# Patient Record
Sex: FEMALE | Race: WHITE | Employment: OTHER | URBAN - METROPOLITAN AREA
[De-identification: names, ages, dates, MRNs, and addresses within clinical notes are randomized per-mention and may not be internally consistent; named-entity substitution may affect disease eponyms.]

---

## 2017-12-21 ENCOUNTER — HOSPITAL ENCOUNTER (OUTPATIENT)
Dept: GENERAL RADIOLOGY | Age: 77
Discharge: HOME | End: 2017-12-21
Attending: INTERNAL MEDICINE
Payer: MEDICARE

## 2017-12-21 DIAGNOSIS — R05.9 COUGH: ICD-10-CM

## 2017-12-21 PROCEDURE — 71020 XR CHEST PA LAT: CPT

## 2018-01-08 ENCOUNTER — ANESTHESIA EVENT (OUTPATIENT)
Dept: ENDOSCOPY | Age: 78
End: 2018-01-08
Payer: MEDICARE

## 2018-01-09 ENCOUNTER — HOSPITAL ENCOUNTER (OUTPATIENT)
Age: 78
Setting detail: OUTPATIENT SURGERY
Discharge: HOME | End: 2018-01-09
Attending: INTERNAL MEDICINE | Admitting: INTERNAL MEDICINE
Payer: MEDICARE

## 2018-01-09 ENCOUNTER — ANESTHESIA (OUTPATIENT)
Dept: ENDOSCOPY | Age: 78
End: 2018-01-09
Payer: MEDICARE

## 2018-01-09 VITALS
SYSTOLIC BLOOD PRESSURE: 108 MMHG | HEART RATE: 78 BPM | OXYGEN SATURATION: 97 % | DIASTOLIC BLOOD PRESSURE: 63 MMHG | RESPIRATION RATE: 16 BRPM | TEMPERATURE: 98.1 F | HEIGHT: 65 IN | BODY MASS INDEX: 25.33 KG/M2 | WEIGHT: 152 LBS

## 2018-01-09 PROBLEM — R10.13 EPIGASTRIC PAIN: Status: ACTIVE | Noted: 2018-01-09

## 2018-01-09 PROCEDURE — 88305 TISSUE EXAM BY PATHOLOGIST: CPT | Performed by: INTERNAL MEDICINE

## 2018-01-09 PROCEDURE — 77030009426 HC FCPS BIOP ENDOSC BSC -B: Performed by: INTERNAL MEDICINE

## 2018-01-09 PROCEDURE — 76060000031 HC ANESTHESIA FIRST 0.5 HR: Performed by: INTERNAL MEDICINE

## 2018-01-09 PROCEDURE — 74011250636 HC RX REV CODE- 250/636: Performed by: NURSE ANESTHETIST, CERTIFIED REGISTERED

## 2018-01-09 PROCEDURE — 76040000019: Performed by: INTERNAL MEDICINE

## 2018-01-09 PROCEDURE — 74011250636 HC RX REV CODE- 250/636

## 2018-01-09 RX ORDER — SODIUM CHLORIDE 0.9 % (FLUSH) 0.9 %
5-10 SYRINGE (ML) INJECTION AS NEEDED
Status: CANCELLED | OUTPATIENT
Start: 2018-01-09 | End: 2018-01-09

## 2018-01-09 RX ORDER — SODIUM CHLORIDE, SODIUM LACTATE, POTASSIUM CHLORIDE, CALCIUM CHLORIDE 600; 310; 30; 20 MG/100ML; MG/100ML; MG/100ML; MG/100ML
75 INJECTION, SOLUTION INTRAVENOUS CONTINUOUS
Status: DISCONTINUED | OUTPATIENT
Start: 2018-01-09 | End: 2018-01-09 | Stop reason: HOSPADM

## 2018-01-09 RX ORDER — SODIUM CHLORIDE 0.9 % (FLUSH) 0.9 %
5-10 SYRINGE (ML) INJECTION EVERY 8 HOURS
Status: CANCELLED | OUTPATIENT
Start: 2018-01-09 | End: 2018-01-09

## 2018-01-09 RX ORDER — DEXTROMETHORPHAN/PSEUDOEPHED 2.5-7.5/.8
1.2 DROPS ORAL
Status: CANCELLED | OUTPATIENT
Start: 2018-01-09

## 2018-01-09 RX ORDER — PROPOFOL 10 MG/ML
INJECTION, EMULSION INTRAVENOUS AS NEEDED
Status: DISCONTINUED | OUTPATIENT
Start: 2018-01-09 | End: 2018-01-09 | Stop reason: HOSPADM

## 2018-01-09 RX ORDER — PROPOFOL 10 MG/ML
INJECTION, EMULSION INTRAVENOUS
Status: DISCONTINUED | OUTPATIENT
Start: 2018-01-09 | End: 2018-01-09 | Stop reason: HOSPADM

## 2018-01-09 RX ORDER — SODIUM CHLORIDE 9 MG/ML
25 INJECTION, SOLUTION INTRAVENOUS CONTINUOUS
Status: CANCELLED | OUTPATIENT
Start: 2018-01-09 | End: 2018-01-09

## 2018-01-09 RX ADMIN — PROPOFOL 100 MCG/KG/MIN: 10 INJECTION, EMULSION INTRAVENOUS at 10:20

## 2018-01-09 RX ADMIN — PROPOFOL 100 MG: 10 INJECTION, EMULSION INTRAVENOUS at 10:20

## 2018-01-09 RX ADMIN — SODIUM CHLORIDE, SODIUM LACTATE, POTASSIUM CHLORIDE, AND CALCIUM CHLORIDE 75 ML/HR: 600; 310; 30; 20 INJECTION, SOLUTION INTRAVENOUS at 09:12

## 2018-01-09 NOTE — ANESTHESIA PREPROCEDURE EVALUATION
Anesthetic History   No history of anesthetic complications            Review of Systems / Medical History  Patient summary reviewed and pertinent labs reviewed    Pulmonary  Within defined limits                 Neuro/Psych   Within defined limits           Cardiovascular    Hypertension: well controlled        Dysrhythmias : sinus tachycardia           GI/Hepatic/Renal  Within defined limits              Endo/Other  Within defined limits           Other Findings   Comments:   Risk Factors for Postoperative nausea/vomiting:       History of postoperative nausea/vomiting? NO       Female? YES       Motion sickness? NO       Intended opioid administration for postoperative analgesia? NO      Smoking Abstinence  Current Smoker? NO  Elective Surgery? YES  Seen preoperatively by anesthesiologist or proxy prior to day of surgery? YES  Pt abstained from smoking 24 hours prior to anesthesia?  N/A           Physical Exam    Airway  Mallampati: II  TM Distance: 4 - 6 cm  Neck ROM: normal range of motion   Mouth opening: Normal     Cardiovascular  Regular rate and rhythm,  S1 and S2 normal,  no murmur, click, rub, or gallop             Dental  No notable dental hx       Pulmonary  Breath sounds clear to auscultation               Abdominal  Abdominal exam normal       Other Findings            Anesthetic Plan    ASA: 3  Anesthesia type: MAC            Anesthetic plan and risks discussed with: Patient

## 2018-01-09 NOTE — ANESTHESIA POSTPROCEDURE EVALUATION
Post-Anesthesia Evaluation and Assessment    Patient: Regan Jackson MRN: [id number]  SSN: [id number]    YOB: 1940  Age: 78 y.o. Sex: female      Data from PACU flowsheet    Cardiovascular Function/Vital Signs  Visit Vitals    /63    Pulse 78    Temp 36.7 °C (98.1 °F)    Resp 16    Ht 5' 5\" (1.651 m)    Wt 68.9 kg (152 lb)    SpO2 97%    BMI 25.29 kg/m2       Patient is status post anesthesia    Nausea/Vomiting: controlled    Postoperative hydration reviewed and adequate. Pain:  Managed    Neurological Status: At baseline    Mental Status and Level of Consciousness: Alert and oriented     Pulmonary Status:   Adequate oxygenation and airway patent    Complications related to anesthesia: None    Post-anesthesia assessment completed.  No concerns    Signed By: Shira Moreno CRNA     January 9, 2018

## 2018-01-09 NOTE — PROCEDURES
Endoscopy Procedure Note    Patient: Rigo Cruz MRN: [id number]  SSN: [id number]    YOB: 1940  Age: 78 y.o. Sex: female      Date/Time:  1/9/2018 10:25 AM    Esophagogastroduodenoscopy (EGD) Procedure Report    Procedure: Esophagogastroduodenoscopy with biopsy    IMPRESSION:   1. Irregular Z-line--hyperplastic appearing tissue at the GE junction bx'd to confirm  2. Normal mucosa of the stomach, duodenal bulb and second portion    RECOMMENDATIONS:  1. Call me in 2-3 weeks for biopsy results if not received beforehand. 2. Call my office to obtain a sheet of FLATUS PRODUCING FOODS. Avoid all foods in the MODERATE AND SEVERE FLATUS PRODUCING CATEGORIES  3.  Keep Feb office appt  4. Call my office to arrange dedicated small bowel xray --and call me 2 days later for results    Indication: abdominal pain  :  Verna Dye MD  Referring Provider:   Anita Headley MD  Assistants: Endoscopy Technician-1: Rosie Lesch  Pre Op Nurse: Gary Finley RN  Endoscopy RN-1: Jeffrey Ireland RN  History: The history and physical exam were reviewed and updated. Endoscope: [id number]  ASA: ASA 2 - Patient with mild systemic disease with no functional limitations  Mallampati: II (soft palate, uvula, fauces visible)  Sedation:  MAC anesthesia    Description of the procedure: The procedure was discussed with the patient including risks, benefits, alternatives including risks of iv sedation, bleeding, perforation and aspiration. A safety timeout was performed. The patient was placed in the left lateral decubitus position. A bite block was placed. The patient was given incremental doses of intravenous medication until moderate sedation was achieved. The patients vital signs were monitored at all times including heart rate/rhythm, blood pressure and oxygen saturation.     The endoscope was then passed under direct visualization into the esophagus, across the GE junction into the stomach and through the pylorus into the duodenal bulb and second portion. The endoscope was then slowly withdrawn while visualizing the mucosa. In the stomach a retroflexion was performed and gastric fundus and cardia visualized. .The endoscope was then slowly withdrawn. The patient was then transferred to recovery in stable condition. Findings: see impression above    Complications:   none    EBL: minimal    Discharge disposition:  Home in the company of  when able to ambulate    Monroe Justin.  MD Lupis, ANA CRISTINA Hatch  January 9, 2018  10:25 AM

## 2018-01-09 NOTE — IP AVS SNAPSHOT
[address] [address] [address] 
[phone] Patient: Lasha Tiwari MRN: [id number]:[id number] About your hospitalization You were admitted on:  January 9, 2018 You last received care in the:  [facility] PHASE 2 RECOVERY You were discharged on:  January 9, 2018 Why you were hospitalized Your primary diagnosis was:  Epigastric Pain Follow-up Information Follow up With Details Comments Contact Info Portia Coleman MD   [address] [id number] [phone] Discharge Orders None A check priyanka indicates which time of day the medication should be taken. My Medications CONTINUE taking these medications Instructions Each Dose to Equal  
 Morning Noon Evening Bedtime  
 calcium and magnesium carbonat 311-232 mg per tablet Your last dose was: Your next dose is: Take 1 Tab by mouth daily. 1 Tab  
    
   
   
   
  
 COQ10 (LIPOSOMAL UBIQUINOL) PO Your last dose was: Your next dose is: Take  by mouth. CRESTOR 5 mg tablet Generic drug:  rosuvastatin Your last dose was: Your next dose is: Take 5 mg by mouth nightly. 5 mg FISH OIL 1,000 mg Cap Generic drug:  omega-3 fatty acids-vitamin e Your last dose was: Your next dose is: Take 1 Cap by mouth daily. 1 Cap  
    
   
   
   
  
 lisinopril 10 mg tablet Commonly known as:  De Douse Your last dose was: Your next dose is: Take 10 mg by mouth daily. 10 mg  
    
   
   
   
  
 MULTIVITAMIN & MINERAL FORMULA PO Your last dose was: Your next dose is: Take 1 Tab by mouth daily. 1 Tab VITAMIN D3 PO Your last dose was: Your next dose is: Take  by mouth. Vitamins-Lipotropics 200-100 mg Tab Your last dose was: Your next dose is: Take  by mouth. Discharge Instructions RECOMMENDATIONS: 
1. Call me in 2-3 weeks for biopsy results if not received beforehand. 2. Call my office to obtain a sheet of FLATUS PRODUCING FOODS. Avoid all foods in the MODERATE AND SEVERE FLATUS PRODUCING CATEGORIES 3.  Keep Feb office appt 4. Call my office to arrange dedicated small bowel xray --and call me 2 days later for results Upper GI Endoscopy: What to Expect at [facility] Your Recovery After you have an endoscopy, you will stay at the hospital or clinic for 1 to 2 hours. This will allow the medicine to wear off. You will be able to go home after your doctor or nurse checks to make sure you are not having any problems. You may have to stay overnight if you had treatment during the test. You may have a sore throat for a day or two after the test. 
This care sheet gives you a general idea about what to expect after the test. 
How can you care for yourself at home? Activity · Rest as much as you need to after you go home. · You should be able to go back to your usual activities the day after the test. 
Diet · Follow your doctor's directions for eating after the test. 
· Drink plenty of fluids (unless your doctor has told you not to). Medications · If you have a sore throat the day after the test, use an over-the-counter spray to numb your throat. Follow-up care is a key part of your treatment and safety. Be sure to make and go to all appointments, and call your doctor if you are having problems. It's also a good idea to know your test results and keep a list of the medicines you take. When should you call for help? Call 911 anytime you think you may need emergency care. For example, call if: 
? · You passed out (loses consciousness). ? · You have trouble breathing. ? · You pass maroon or bloody stools. ?Call your doctor now or seek immediate medical care if: 
? · You have pain that does not get better after your take pain medicine. ? · You have new or worse belly pain. ? · You have blood in your stools. ? · You are sick to your stomach and cannot keep fluids down. ? · You have a fever. ? · You cannot pass stools or gas. ? Watch closely for changes in your health, and be sure to contact your doctor if: 
? · Your throat still hurts after a day or two. ? · You do not get better as expected. Where can you learn more? Go to http://abbey-madeline.info/. Enter (46) 185-143 in the search box to learn more about \"Upper GI Endoscopy: What to Expect at Home. \" Current as of: May 12, 2017 Content Version: 11.4 © 9302-5947 [employer]. Care instructions adapted under license by [employer] (which disclaims liability or warranty for this information). If you have questions about a medical condition or this instruction, always ask your healthcare professional. [place] [address] any warranty or liability for your use of this information. DISCHARGE SUMMARY from Nurse PATIENT INSTRUCTIONS: 
 
After general anesthesia or intravenous sedation, for 24 hours or while taking prescription Narcotics: · Limit your activities · Do not drive and operate hazardous machinery · Do not make important personal or business decisions · Do  not drink alcoholic beverages · If you have not urinated within 8 hours after discharge, please contact your surgeon on call. Report the following to your surgeon: 
· Excessive pain, swelling, redness or odor of or around the surgical area · Temperature over 100.5 · Nausea and vomiting lasting longer than 4 hours or if unable to take medications · Any signs of decreased circulation or nerve impairment to extremity: change in color, persistent  numbness, tingling, coldness or increase pain · Any questions What to do at Home: 
Recommended activity: Activity as tolerated and no driving for today. These are general instructions for a healthy lifestyle: No smoking/ No tobacco products/ Avoid exposure to second hand smoke Surgeon General's Warning:  Quitting smoking now greatly reduces serious risk to your health. Obesity, smoking, and sedentary lifestyle greatly increases your risk for illness A healthy diet, regular physical exercise & weight monitoring are important for maintaining a healthy lifestyle You may be retaining fluid if you have a history of heart failure or if you experience any of the following symptoms:  Weight gain of 3 pounds or more overnight or 5 pounds in a week, increased swelling in our hands or feet or shortness of breath while lying flat in bed. Please call your doctor as soon as you notice any of these symptoms; do not wait until your next office visit. Recognize signs and symptoms of STROKE: 
 
F-face looks uneven A-arms unable to move or move unevenly S-speech slurred or non-existent T-time-call 911 as soon as signs and symptoms begin-DO NOT go Back to bed or wait to see if you get better-TIME IS BRAIN. Warning Signs of HEART ATTACK Call 911 if you have these symptoms: 
? Chest discomfort. Most heart attacks involve discomfort in the center of the chest that lasts more than a few minutes, or that goes away and comes back. It can feel like uncomfortable pressure, squeezing, fullness, or pain. ? Discomfort in other areas of the upper body. Symptoms can include pain or discomfort in one or both arms, the back, neck, jaw, or stomach. ? Shortness of breath with or without chest discomfort. ? Other signs may include breaking out in a cold sweat, nausea, or lightheadedness. Don't wait more than five minutes to call [address]! Fast action can save your life.  Calling 911 is almost always the fastest way to get lifesaving treatment. Emergency Medical Services staff can begin treatment when they arrive  up to an hour sooner than if someone gets to the hospital by car. The discharge information has been reviewed with the patient. The patient verbalized understanding. Discharge medications reviewed with the patient and appropriate educational materials and side effects teaching were provided. Patient armband removed and given to patient to take home. Patient was informed of the privacy risks if armband lost or stolen 
 
___________________________________________________________________________________________________________________________________ Introducing [facility]! Dear Rudy Truong: Thank you for requesting a [employer] account. Our records indicate that you already have an active [employer] account. You can access your account anytime at [email or web address] [email or web address] Did you know that you can access your hospital and ER discharge instructions at any time in [employer]? You can also review all of your test results from your hospital stay or ER visit. Additional Information If you have questions, please visit the Frequently Asked Questions section of the [employer] website at [email or web address] [email or web address] Remember, [employer] is NOT to be used for urgent needs. For medical emergencies, dial 911. Now available from your iPhone and Android! Providers Seen During Your Hospitalization Provider Specialty Primary office phone Miles Hollis MD Gastroenterology [phone] Your Primary Care Physician (PCP) Primary Care Physician Office Phone Office Fax Kendra Linda [phone] You are allergic to the following Allergen Reactions Sulfa (Sulfonamide Antibiotics) Swelling Recent Documentation Height Weight BMI OB Status Smoking Status 1.651 m 68.9 kg 25.29 kg/m2 Postmenopausal Former Smoker Emergency Contacts Name Discharge Info Relation Home Work Mobile Tom Figueroa Sr DISCHARGE CAREGIVER [3] Spouse [3] [phone] Patient Belongings The following personal items are in your possession at time of discharge: 
  Dental Appliances: Partials  Visual Aid: Glasses Please provide this summary of care documentation to your next provider. Signatures-by signing, you are acknowledging that this After Visit Summary has been reviewed with you and you have received a copy. Patient Signature:  ____________________________________________________________ Date:  ____________________________________________________________  
  
[facility] Provider Signature:  ____________________________________________________________ Date:  ____________________________________________________________

## 2018-01-09 NOTE — DISCHARGE INSTRUCTIONS
RECOMMENDATIONS:  1. Call me in 2-3 weeks for biopsy results if not received beforehand. 2. Call my office to obtain a sheet of FLATUS PRODUCING FOODS. Avoid all foods in the MODERATE AND SEVERE FLATUS PRODUCING CATEGORIES  3.  Keep Feb office appt  4. Call my office to arrange dedicated small bowel xray --and call me 2 days later for results       Upper GI Endoscopy: What to Expect at [address] [place] [address]  After you have an endoscopy, you will stay at the hospital or clinic for 1 to 2 hours. This will allow the medicine to wear off. You will be able to go home after your doctor or nurse checks to make sure you are not having any problems. You may have to stay overnight if you had treatment during the test. You may have a sore throat for a day or two after the test.  This care sheet gives you a general idea about what to expect after the test.  How can you care for yourself at home? Activity  · Rest as much as you need to after you go home. · You should be able to go back to your usual activities the day after the test.  Diet  · Follow your doctor's directions for eating after the test.  · Drink plenty of fluids (unless your doctor has told you not to). Medications  · If you have a sore throat the day after the test, use an over-the-counter spray to numb your throat. Follow-up care is a key part of your treatment and safety. Be sure to make and go to all appointments, and call your doctor if you are having problems. It's also a good idea to know your test results and keep a list of the medicines you take. When should you call for help? Call 911 anytime you think you may need emergency care. For example, call if:  ? · You passed out (loses consciousness). ? · You have trouble breathing. ? · You pass maroon or bloody stools. ?Call your doctor now or seek immediate medical care if:  ? · You have pain that does not get better after your take pain medicine. ? · You have new or worse belly pain.    ? · You have blood in your stools. ? · You are sick to your stomach and cannot keep fluids down. ? · You have a fever. ? · You cannot pass stools or gas. ? Watch closely for changes in your health, and be sure to contact your doctor if:  ? · Your throat still hurts after a day or two. ? · You do not get better as expected. Where can you learn more? Go to http://abbey-madeline.info/. Enter (52) [phone] in the search box to learn more about \"Upper GI Endoscopy: What to Expect at Home. \"  Current as of: May 12, 2017  Content Version: 11.4  © 7851-7425 [employer]. Care instructions adapted under license by [employer] (which disclaims liability or warranty for this information). If you have questions about a medical condition or this instruction, always ask your healthcare professional. [place] [address] any warranty or liability for your use of this information. DISCHARGE SUMMARY from Nurse    PATIENT INSTRUCTIONS:    After general anesthesia or intravenous sedation, for 24 hours or while taking prescription Narcotics:  · Limit your activities  · Do not drive and operate hazardous machinery  · Do not make important personal or business decisions  · Do  not drink alcoholic beverages  · If you have not urinated within 8 hours after discharge, please contact your surgeon on call. Report the following to your surgeon:  · Excessive pain, swelling, redness or odor of or around the surgical area  · Temperature over 100.5  · Nausea and vomiting lasting longer than 4 hours or if unable to take medications  · Any signs of decreased circulation or nerve impairment to extremity: change in color, persistent  numbness, tingling, coldness or increase pain  · Any questions    What to do at Home:  Recommended activity: Activity as tolerated and no driving for today.          These are general instructions for a healthy lifestyle:    No smoking/ No tobacco products/ Avoid exposure to second hand smoke  Surgeon General's Warning:  Quitting smoking now greatly reduces serious risk to your health. Obesity, smoking, and sedentary lifestyle greatly increases your risk for illness    A healthy diet, regular physical exercise & weight monitoring are important for maintaining a healthy lifestyle    You may be retaining fluid if you have a history of heart failure or if you experience any of the following symptoms:  Weight gain of 3 pounds or more overnight or 5 pounds in a week, increased swelling in our hands or feet or shortness of breath while lying flat in bed. Please call your doctor as soon as you notice any of these symptoms; do not wait until your next office visit. Recognize signs and symptoms of STROKE:    F-face looks uneven    A-arms unable to move or move unevenly    S-speech slurred or non-existent    T-time-call 911 as soon as signs and symptoms begin-DO NOT go       Back to bed or wait to see if you get better-TIME IS BRAIN. Warning Signs of HEART ATTACK     Call 911 if you have these symptoms:   Chest discomfort. Most heart attacks involve discomfort in the center of the chest that lasts more than a few minutes, or that goes away and comes back. It can feel like uncomfortable pressure, squeezing, fullness, or pain.  Discomfort in other areas of the upper body. Symptoms can include pain or discomfort in one or both arms, the back, neck, jaw, or stomach.  Shortness of breath with or without chest discomfort.  Other signs may include breaking out in a cold sweat, nausea, or lightheadedness. Don't wait more than five minutes to call 911 - MINUTES MATTER! Fast action can save your life. Calling 911 is almost always the fastest way to get lifesaving treatment. Emergency Medical Services staff can begin treatment when they arrive -- up to an hour sooner than if someone gets to the hospital by car. The discharge information has been reviewed with the patient. The patient verbalized understanding. Discharge medications reviewed with the patient and appropriate educational materials and side effects teaching were provided. Patient armband removed and given to patient to take home.   Patient was informed of the privacy risks if armband lost or stolen    ___________________________________________________________________________________________________________________________________

## 2018-01-09 NOTE — H&P
Reason for Appointment   1. H. pylori infection   2. Heartburn   3. Bowel irregularity     History of Present Illness   General:   I saw the patient in the office last in February 2014. I have been seeing her for abdominal pain. Extensive testing including an upper endoscopy, colonoscopy PillCam examination of the small bowel, abdominal ultrasound, HIDA scan with CCK had all been negative or nondiagnostic for sludge in the gallbladder with normal gallbladder function indicated by ejection fraction of 40%. I did not recommend cholecystectomy as I thought the patient's symptoms are likely functional in origin. I recommended monitoring symptoms with reevaluation depending on the clinical course. Her next colonoscopy was due in 10 years which in 2023 because she was average risk. I have not seen the patient now since 2014. December 5, 2017--The patient's here to discuss a variety of GI issues. Since the first of the year she's been having episodes of supraventricular tachycardia. She relates this to the stress of moving and losing one of her best friend's Mrs. Kacey Gardiner, who was also a patient of mine. She developed substernal burning discomfort which she believes is related to stress as well. She went to a homeopathic physician who ordered a stool specimen for a variety of testing. Her bowel movements have been erratic. She's had excessive flatulence more so than eructation. She is taking Panzyme 3 tablets daily with each meal and digestive enzyme 3 tablets with the last bite of each meal to help her digestion. She has noticed some improvement. She was told that she tested positive for H. pylori and her stool calprotectin was elevated at 115. It should be less than 50. She was advised to see a traditional physician about the source of the elevated stool calprotectin. She denies nausea or vomiting.  She does not have abdominal pain but rather substernal burning discomfort and bowel irregularity as her main complaints in addition to flatulence. She denies melena or hematochezia. Her appetite is good and her weight is stable. Current Medications   Taking    Vit D    Calcium    multivitamin Multiple Vitamins capsule 1 cap(s) orally once a day    lisinopril 10 mg tablet 1 tab(s) orally QD    Crestor 5 mg tablet 1 tab(s) orally once a day (at bedtime)    Omega-3 1000 mg capsule 1 cap(s) orally 3 times a day    Co Q-10 100 mg capsule 1 cap(s) orally once a day    Probiotic 1tab qd    Digestive Enzymes Tid    Not-Taking/PRN    Pataday 0.2% solution 1 gtt in each affected eye once a day    Alrex 0.2% suspension 1 gtt in each affected eye 4 times a day    Discontinued    Vit B-12 UNKNOWN as directed as directed as directed as directed    Vitamin C    Fish Oil    Antioxidant Formula Antioxidant Multiple Vitamins and Minerals capsule 1 cap(s) orally once a day    Tretinoin Emollient Topical 0.05% cream 1 ramirez applied topically once a day (at bedtime)    tretinoin topical 0.05% cream 1 ramirez applied topically once a day (at bedtime)    ammonium lactate topical 12% cream 1 ramirez applied topically 2 times a day    Medication List reviewed and reconciled with the patient      Past Medical History   Diverticulosis. Small External Hemorrhoids. Hypertension. Hyperlipidemia. Surgical History   Colonoscopy    Tonsillectomy    Appendectomy    Cataract    BTL    Stent in tear duct 2012   repair of enterocele/rectocele May 2011     Family History   Father: , Abiola Urbina   Mother:    Maternal Grand Mother: Diabetes   2 sister(s) . No family history of colon cancer, colon polyps, or GI cancer. No inflammatory bowel disease, stomach, liver, or pancreatic cancer, or chronic liver disease to her knowledge. Social History   Marital Status: . Occupation: Retired. Smoking   Tobacco use: former smoker  Alcohol: socially, often wine with dinner, beer in summer. no Drugs (Illicit). no Tattoos.    Body piercing: ears pierced. no Blood transfusions. Allergies   Sulfa: Swelling: Allergy     Review of Systems   Complete review of systems taken. Positives per HPI. Negatives will not be listed here. Vital Signs   BMI 25.62, HR 75, /82, Wt 154, Ht 5'5\", RR 16.     Examination   General examination:  LABORATORY DATA: Lab work July 27, 2017, YFN negative. .Lab work May 2017: WBC 4.1, hemoglobin 13.8 with hematocrit 42.5, MCV 93, platelets 684. BMP showed a chloride of 196, creatinine 0.7, otherwise normal Stool specimen October 2, 2017: C. difficile toxin, enteric pathogens negative. Cryptosporidia, amoeba, Giardia negative. H. pylori elevated, the method of assay, however, is not clear. Fecal occult blood negative. Antigliadin antibody IgA negative. Physical Examination   General: Pleasant, anxious appearing female in no obvious distress. Neck: Supple. No thyromegaly or mass palpable. Nodes: No supraclavicular, axillary, cervical nodes palpable. Chest: Clear to auscultation with symmetric breath sounds and good air movement. Heart: First and second heart sounds are normal. No murmurs, rubs, gallops. Abdomen: Soft, nontender. No organomegaly or mass palpable. Bowel sounds are normal with no bruits. Extremities: No pedal edema. Assessments     1. Helicobacter pylori (H. pylori) - A04.8 (Primary), The patient had a stool panel checked for various disorders/diseases. It was positive for H. pylori, B fragilis, calprotectin. Elastase was low. I would prefer to check stool for H. pylori antigen in a conventional lab to verify that it is, in fact, positive before subjecting the patient to quadruple therapy for H. pylori over 2 weeks of time since there are significant side effects from these medications in some people.  If stool for H. pylori antigen is in fact documented to be positive, I will then recommend quadruple therapy and reassess H. pylori antigen in the stool after 6-8 weeks to verify successful eradication. The patient's recent symptoms of heartburn and bowel irregularity are likely stress related as suggested by Dr. Shantell Wyatt. 2. Essential (primary) hypertension - I10     3. FHx: SVT (supraventricular tachycardia) - Z82.49     4. Change in bowel function - R19.4, Bowel irregularity has improved with digestive enzymes and gastric acid pills. She reports improvement in her substernal burning discomfort on this regimen as well. Will need to monitor at some point after discontinuing these medications for several weeks to see if symptoms return at which point further testing may be necessary. Treatment   1. Helicobacter pylori (H. pylori)   LAB: Stool H. Pylori Antigen  Notes: 1. Stool for H. Pylori antigen today. Call me next week for results. 2. If positive, then I will treat H. pylori with quadruple therapy and then repeat stool for H. pylori antigen in 8 weeks or so after she completes treatment. 3. If stool for H. pylori antigen at a conventional lab is negative however, I would not recommend treatment. 2. Change in bowel function   Notes: 1. The patient should avoid lactose-containing foods and beverages. 2. The patient should avoid artificial sweeteners. 3. Over the next several weeks she should discontinue her digestive enzyme and gastric acid and call back with a report in 3 weeks as to current symptoms. 4. Depending on those symptoms, further testing may be necessary or perhaps continued monitoring. 5. OK to use SOY, RICE, ALMOND milk for now. 6. Office in in late Feb/early March. Symptoms improved transiently while on PPI therapy but have returned, EGD to r/o ulcer or UGI neoplasm.  Otherwise, no significant change in history or physical exam since last office visit

## 2018-01-15 ENCOUNTER — HOSPITAL ENCOUNTER (OUTPATIENT)
Dept: GENERAL RADIOLOGY | Age: 78
Discharge: HOME | End: 2018-01-15
Attending: INTERNAL MEDICINE
Payer: MEDICARE

## 2018-01-15 DIAGNOSIS — R10.13 EPIGASTRIC PAIN: ICD-10-CM

## 2018-01-15 PROCEDURE — 74011000255 HC RX REV CODE- 255: Performed by: INTERNAL MEDICINE

## 2018-01-15 PROCEDURE — 74250 X-RAY XM SM INT 1CNTRST STD: CPT

## 2018-01-15 RX ADMIN — BARIUM SULFATE 352 G: 960 POWDER, FOR SUSPENSION ORAL at 11:29

## 2018-06-06 ENCOUNTER — HOSPITAL ENCOUNTER (OUTPATIENT)
Dept: PHYSICAL THERAPY | Age: 78
Discharge: HOME | End: 2018-06-06
Payer: MEDICARE

## 2018-06-06 PROCEDURE — 97110 THERAPEUTIC EXERCISES: CPT

## 2018-06-06 PROCEDURE — G8978 MOBILITY CURRENT STATUS: HCPCS

## 2018-06-06 PROCEDURE — G8979 MOBILITY GOAL STATUS: HCPCS

## 2018-06-06 PROCEDURE — 97140 MANUAL THERAPY 1/> REGIONS: CPT

## 2018-06-06 PROCEDURE — 97161 PT EVAL LOW COMPLEX 20 MIN: CPT

## 2018-06-06 NOTE — PROGRESS NOTES
PT KNEE EVAL AND TREATMENT    Patient Name: Fuentes Newman  Date:2018  : 1940  [x]  Patient  Verified  [place]or: [place] MED[place] / Plan: [place] MEDICARE PART A & B / Product Type: Medicare /    In time:915  Out time:1015  Total Treatment Time (min): 60  Total Timed Codes (min): 25  1:1 Treatment Time ( [address] only): 50   Visit #: 1 of 8-10    Treatment Area: Right knee pain [M25.561]    SUBJECTIVE  Pain Level (0-10 scale): (C):0  (B): 0 (W):  10  Any medication changes, allergies to medications, diagnosis change, or new procedure performed: see summary sheet for update  Subjective functional status/changes:  CHIEF COMPLAINT: Patient reports with co R knee/ posterior leg pain after a mild fall onto the knee and \"pulling\" the knee when trying to get boots off in 2018. Pain is progressively improving and patient reports only certain movements that aggravate the pain such as lifting motion/ climbing bleachers. Xrays show arthritis. DEFICITS: fear avoidance, stair negotiation - heath descending, gym activities: riding bike, leg wt machine     OBJECTIVE  Posture:  WNL    Gait:  WNL     ROM / Strength                               AROM                   Strength (1-5)  Hip Left Right Left Right   Flexion    5 4+ sartorius pulling   Abduction   5 5   Extension   4+ 4+   Knee x x x x   Extension 0 0 5 5   Flexion   PROM 126 5 5   Core/ bridge  WNL  x x x        Flexibility:   Hamstrings:  90/90 HS test L 0 deg, R 11 deg   Quadriceps: Merle Test + with co ADD tightness also   Gastroc:   NT  Other: NA     Palpation:    Optional Tests:  Patellar Positioning (Static) WNL   Patellar Tracking WNL      Patellar Mobility WNL        Girth Measurements:     Cm at joint line   Left 37   Right  37             Other tests/comments:      Patient Education/ Therapeutic Exercise : [x] Discussed POT including PT expectation, established HEP with pictures and instruction, completed in clinic  (minutes) : 15    Manual: 10 min grade 2-3 joint mobs for knee flexion, passive manual HS stretch , PROM knee flexion     Modality (rationale): decrease m tension and pain to increase mobility   MHP 10 min - posterior leg       Pain Level (0-10 scale) post treatment: 0    ASSESSMENT  [x]  See Plan of Care    PLAN  [x]  Upgrade activities as tolerated     [x] Other:_ POC  2-3 x 8-10    Garrel Cables, PT 6/6/2018     Justification for Eval Code Complexity:  Patient History : age, chronicity   Examination see exam   Clinical Presentation: stable   Clinical Decision Making : FOTO : 72 /100

## 2018-06-06 NOTE — PROGRESS NOTES
[facility] PHYSICAL THERAPY AT [address] [place] [address]  Phone: [phone] Fax: [phone] / [address] [address] PHYSICAL THERAPY SERVICES  Patient Name: Job Astudillo : 1940   Medical   Diagnosis: Right knee pain [M25.561] Treatment Diagnosis: R HS strain    Onset Date: 2018     Referral Source: Godfrey Hinojosa MD Start of Care [facility]): 2018   Prior Hospitalization: See medical history Provider #: [id number]   Prior Level of Function: Functional I , gym workouts    Comorbidities: HTN    Medications: Verified on Patient Summary List   The Plan of Care and following information is based on the information from the initial evaluation.   ========================================================================  Assessment / key information:  Pt is a 78y.o. year old female who presents with co R knee/ posterior leg pain after a mild fall onto the knee and \"pulling\" the knee when trying to get boots off in 2018. Pain is progressively improving and patient reports only certain movements that aggravate the pain such as lifting motion/ climbing bleachers. Xrays show arthritis. Current deficits include: increased pain to 10/10 at worst, decreased AROM and strength per chart, moderate HS tightness of R compared to L. Functional deficits include: fear avoidance, stair negotiation - heath descending, gym activities: riding bike, leg wt machine. Home exercise program initiated on initial evaluation to address these deficits. Pt would benefit from PT to address these deficits for increased functional mobility and QOL.       AROM                                       Strength (1-5)  Hip Left Right Left Right   Flexion     5 4+ sartorius pulling   Abduction     5 5   Extension     4+ 4+   Knee x x x x   Extension 0 0 5 5   Flexion   PROM 126 5 5   Core/ bridge  WNL  x x x    ========================================================================  Eval Complexity: History: MEDIUM  Complexity : 1-2 comorbidities / personal factors will impact the outcome/ POC Exam:HIGH Complexity : 4+ Standardized tests and measures addressing body structure, function, activity limitation and / or participation in recreation  Presentation: LOW Complexity : Stable, uncomplicated  Clinical Decision Making:MEDIUM Complexity : FOTO score of 26-74Overall Complexity:LOW   Problem List: pain affecting function, decrease ROM, decrease strength, edema affecting function, impaired gait/ balance, decrease ADL/ functional abilitiies, decrease activity tolerance, decrease flexibility/ joint mobility, decrease transfer abilities and other FOTO 72/100   Treatment Plan may include any combination of the following: Therapeutic exercise, Therapeutic activities, Neuromuscular re-education, Physical agent/modality, Gait/balance training, Manual therapy, Aquatic therapy, Patient education, Self Care training, Functional mobility training, Home safety training and Stair training  Patient / Family readiness to learn indicated by: asking questions, trying to perform skills and interest  Persons(s) to be included in education: patient (P)  Barriers to Learning/Limitations: None  Measures taken:    Patient Goal (s): \"more movement/ riding bike\"   Patient self reported health status: good  Rehabilitation Potential: good   Short Term Goals: To be accomplished in  1  weeks:  1. Pt will be independent and compliant with HEP   Long Term Goals: To be accomplished in  8-10  treatments:  1. Patient will increase FOTO score to 73/100 for indications of increased functional mobility. 2.  Patient will demo B symmetrical AROM knee flexion for symmetry with gait   3. Patient will demo B symmetrical 90/90 HS flexibility for improved myofascial mobility with return to gym activities   4.  Patient will report return to bike riding at the gym for progression to PLOF and decreased fear avoidance   Frequency / Duration:   Patient to be seen  2-3  times per week for 8-10  treatments:  Patient / Caregiver education and instruction: self care, activity modification and exercises  G-Codes (GP): Mobility: [id number] Current  CJ= 20-39%   Goal  CJ= 20-39%. The severity rating is based on the FOTO Score  Therapist Signature: Nilo Scott PT Date: 6/1/4341   Certification Period: 6/6/18-9/4/18 Time: 1016am    ========================================================================  I certify that the above Physical Therapy Services are being furnished while the patient is under my care. I agree with the treatment plan and certify that this therapy is necessary. Physician Signature:        Date:       Time:   Please sign and return to [facility] or you may fax the signed copy to [phone]. Thank you.

## 2018-06-12 ENCOUNTER — HOSPITAL ENCOUNTER (OUTPATIENT)
Dept: PHYSICAL THERAPY | Age: 78
Discharge: HOME | End: 2018-06-12
Payer: MEDICARE

## 2018-06-12 PROCEDURE — 97140 MANUAL THERAPY 1/> REGIONS: CPT

## 2018-06-12 PROCEDURE — 97110 THERAPEUTIC EXERCISES: CPT

## 2018-06-12 NOTE — PROGRESS NOTES
PT DAILY TREATMENT NOTE     Patient Name: Nafisa Plascencia  Date:2018  : 1940  [x]  Patient  Verified  Payor: [place] MEDICARE / Plan: [place] MEDICARE PART A & B / Product Type: Medicare /    In time:101  Out time:158  Total Treatment Time (min): 57  Total Timed Codes (min): 47  1:1 Treatment Time (min): 52   Visit #: 2 of 8-10    Treatment Area: Right knee pain [M25.561]    SUBJECTIVE  Pain Level (0-10 scale): 0  Any medication changes, allergies to medications, adverse drug reactions, diagnosis change, or new procedure performed?: [x] No    [] Yes (see summary sheet for update)  Subjective functional status/changes:   [] No changes reported  \"I was a little sore from the exercise, but I think that's normal.\"    OBJECTIVE  Modality rationale: decrease pain and increase tissue extensibility to improve the patients ability to decrease post treatment soreness    Min Type Additional Details    [] Estim: []Att   []Unatt        []TENS instruct                  []IFC  []Premod   []NMES                     []Other:  []w/US   []w/ice   []w/heat  Position:  Location:    []  Traction: [] Cervical       []Lumbar                       [] Prone          []Supine                       []Intermittent   []Continuous Lbs:  [] before manual  [] after manual    []  Ultrasound: []Continuous   [] Pulsed                           []1MHz   []3MHz Location:  W/cm2:    []  Iontophoresis with dexamethasone         Location: [] Take home patch   [] In clinic   10 []  Ice     [x]  heat  []  Ice massage Position: prone   Location: posterior leg     []  Vasopneumatic Device Pressure:       [] lo [] med [] hi   Temperature: [] lo [] med [] hi   [x] Skin assessment post-treatment:  [x]intact []redness- no adverse reaction       []redness - adverse reaction:       37 min Therapeutic Exercise:  [x] See flow sheet :Initiated ther ex per flow sheet    Rationale: increase ROM, increase strength, improve coordination and improve balance to improve the patients ability to improve mobility with all activities     10 min Manual Therapy:  2-3 joint mobs for knee flexion, passive manual HS stretch , PROM knee flexion    Rationale: decrease pain, increase ROM and increase tissue extensibility to improve mobility and progression to PLOF    x min Patient Education: [x] Review HEP from IE        Other Objective/Functional Measures: Therex initiated today - first FU post eval   Continued strain with knee flexion - ie putting foot in bike [facility]    Pain Level (0-10 scale) post treatment: 0    ASSESSMENT/Changes in Function: Patient tolerated initiation of therex very well. 100% VC for form and technique for all newly introduced therex. Patient educated on soreness and DOMS after initiation of new therex. Patient will continue to benefit from skilled PT services to modify and progress therapeutic interventions, address functional mobility deficits, address ROM deficits, address strength deficits, analyze and address soft tissue restrictions, analyze and cue movement patterns, analyze and modify body mechanics/ergonomics and assess and modify postural abnormalities to attain remaining goals. [x]  See Plan of Care  []  See progress note/recertification  []  See Discharge Summary         Progress towards goals / Updated goals:  FIRST FU TREATMENT - CONT PER POT TO EST GOALS 6/12/2018  · Short Term Goals: To be accomplished in  1  weeks:  1. Pt will be independent and compliant with HEP  · Long Term Goals: To be accomplished in  8-10  treatments:  1. Patient will increase FOTO score to 73/100 for indications of increased functional mobility. 2.  Patient will demo B symmetrical AROM knee flexion for symmetry with gait   3. Patient will demo B symmetrical 90/90 HS flexibility for improved myofascial mobility with return to gym activities   4.  Patient will report return to bike riding at the gym for progression to PLOF and decreased fear avoidance PLAN  [x]  Upgrade activities as tolerated     []  Continue plan of care  [x]  Update interventions per flow sheet       []  Discharge due to:_  [x]  Other:_assess response to first FU treatment       Cortez Thomas, PT 6/12/2018

## 2018-06-14 ENCOUNTER — HOSPITAL ENCOUNTER (OUTPATIENT)
Dept: PHYSICAL THERAPY | Age: 78
Discharge: HOME | End: 2018-06-14
Payer: MEDICARE

## 2018-06-14 PROCEDURE — 97140 MANUAL THERAPY 1/> REGIONS: CPT

## 2018-06-14 PROCEDURE — 97110 THERAPEUTIC EXERCISES: CPT

## 2018-06-14 NOTE — PROGRESS NOTES
PT DAILY TREATMENT NOTE     Patient Name: Job Astudillo  Date:2018  : 1940  [x]  Patient  Verified  Payor: Raegan Day / Plan: [place] MEDICARE PART A & B / Product Type: Medicare /    In time: 9:00 am       Out time: 9:59 am  Total Treatment Time (min): 59  Total Timed Codes (min): 49  1:1 Treatment Time (min): 41   Visit #: 3 of 8-10    Treatment Area: Right knee pain [M25.561]    SUBJECTIVE  Pain Level (0-10 scale): 1   Any medication changes, allergies to medications, adverse drug reactions, diagnosis change, or new procedure performed?: [x] No    [] Yes (see summary sheet for update)  Subjective functional status/changes:   [] No changes reported  \"I have been doing the exercises. I just have trouble going up the stairs and getting up from a low surface. \"     OBJECTIVE  Modality rationale: decrease pain and increase tissue extensibility to improve the patients ability to decrease post treatment soreness    Min Type Additional Details    [] Estim: []Att   []Unatt        []TENS instruct                  []IFC  []Premod   []NMES                     []Other:  []w/US   []w/ice   []w/heat  Position:  Location:    []  Traction: [] Cervical       []Lumbar                       [] Prone          []Supine                       []Intermittent   []Continuous Lbs:  [] before manual  [] after manual    []  Ultrasound: []Continuous   [] Pulsed                           []1MHz   []3MHz Location:  W/cm2:    []  Iontophoresis with dexamethasone         Location: [] Take home patch   [] In clinic   10 []  Ice     [x]  heat  []  Ice massage Position: prone   Location: posterior leg     []  Vasopneumatic Device Pressure:       [] lo [] med [] hi   Temperature: [] lo [] med [] hi   [x] Skin assessment post-treatment:  [x]intact []redness- no adverse reaction       []redness - adverse reaction:     39 min Therapeutic Exercise:  [x] See flow sheet: added supine heel slides and prone quad stretch   Rationale: increase ROM, increase strength, improve coordination and improve balance to improve the patients ability to improve mobility with all activities. 10 min Manual Therapy:  supine 2-3 joint mobs for (R) knee flexion, passive manual HS stretch, PROM knee flexion    Rationale: decrease pain, increase ROM and increase tissue extensibility to improve mobility and progression to PLOF. X min Patient Education: [x] Review HEP - encouraged return to upright biking if non-aggravating       Other Objective/Functional Measures:     (R) knee A/PROM, after stretching and MT: 0-130 deg / 0-134 deg [for reference L knee 0-134 deg]   Added prone quad stretch to therex - pt req VCs to decrease intensity of stretch within pain-free ROM    Pain Level (0-10 scale) post treatment: 0    ASSESSMENT/Changes in Function:   Patient demos significant improvement in knee mobility. Added heel slides to therex after MT to inc hamstring strength within newly acquired ROM. Gait WNL and pain-free, however, pt cont to be challenged with deep knee flexion. Patient will continue to benefit from skilled PT services to modify and progress therapeutic interventions, address functional mobility deficits, address ROM deficits, address strength deficits, analyze and address soft tissue restrictions, analyze and cue movement patterns, analyze and modify body mechanics/ergonomics and assess and modify postural abnormalities to attain remaining goals. [x]  See Plan of Care  []  See progress note/recertification  []  See Discharge Summary         Progress towards goals / Updated goals:  Short Term Goals: To be accomplished in  1  weeks:  1. Pt will be independent and compliant with HEP. -Goal met; pt notes compliance (6/14/18)  Long Term Goals: To be accomplished in  8-10  treatments:  1. Patient will increase FOTO score to 73/100 for indications of increased functional mobility. 2.  Patient will demo B symmetrical AROM knee flexion for symmetry with gait. -Goal progressing; AROM 0-130 deg after stretching and MT (6/14/18)  3. Patient will demo B symmetrical 90/90 HS flexibility for improved myofascial mobility with return to gym activities    4. Patient will report return to bike riding at the gym for progression to PLOF and decreased fear avoidance.     PLAN  [x]  Upgrade activities as tolerated     [x]  Continue plan of care  [x]  Update interventions per flow sheet       []  Discharge due to:_  [x]  Other: assess response to tx    Glenn Mendoza, MATEO 6/14/2018

## 2018-06-19 ENCOUNTER — HOSPITAL ENCOUNTER (OUTPATIENT)
Dept: PHYSICAL THERAPY | Age: 78
Discharge: HOME | End: 2018-06-19
Payer: MEDICARE

## 2018-06-19 PROCEDURE — 97110 THERAPEUTIC EXERCISES: CPT

## 2018-06-19 PROCEDURE — 97140 MANUAL THERAPY 1/> REGIONS: CPT

## 2018-06-19 NOTE — PROGRESS NOTES
PT DAILY TREATMENT NOTE     Patient Name: Alba Garcia  Date:2018  : 1940  [x]  Patient  Verified  Payor: Boyer Manny / Plan: [place] [facility] [place] / Product Type: Medicare /    In time: 9:33 am    Out time: 10:40 am  Total Treatment Time (min): 67  Total Timed Codes (min): 57  1:1 Treatment Time (min): 41  Visit #: 4 of 8-10    Treatment Area: Right knee pain [M25.561]    SUBJECTIVE  Pain Level (0-10 scale): 1  Any medication changes, allergies to medications, adverse drug reactions, diagnosis change, or new procedure performed?: [x] No    [] Yes (see summary sheet for update)  Subjective functional status/changes:   [] No changes reported  \"I went and tried the bike for 5 minutes and it felt fine. \"    OBJECTIVE  Modality rationale: decrease pain and increase tissue extensibility to improve the patients ability to decrease post treatment soreness    Min Type Additional Details    [] Estim: []Att   []Unatt        []TENS instruct                  []IFC  []Premod   []NMES                     []Other:  []w/US   []w/ice   []w/heat  Position:  Location:    []  Traction: [] Cervical       []Lumbar                       [] Prone          []Supine                       []Intermittent   []Continuous Lbs:  [] before manual  [] after manual    []  Ultrasound: []Continuous   [] Pulsed                           []1MHz   []3MHz Location:  W/cm2:    []  Iontophoresis with dexamethasone         Location: [] Take home patch   [] In clinic   10 []  Ice     [x]  heat  []  Ice massage Position: prone   Location: posterior leg     []  Vasopneumatic Device Pressure:       [] lo [] med [] hi   Temperature: [] lo [] med [] hi   [x] Skin assessment post-treatment:  [x]intact []redness- no adverse reaction       []redness - adverse reaction:     47 min Therapeutic Exercise:  [x] See flow sheet: added lateral tap downs   Rationale: increase ROM, increase strength, improve coordination and improve balance to improve the patients ability to improve mobility with all activities. 10 min Manual Therapy:  supine 2-3 joint mobs for (R) knee flexion, passive manual HS stretch, PROM knee flexion    Rationale: decrease pain, increase ROM and increase tissue extensibility to improve mobility and progression to PLOF. X min Patient Education: [x] Review HEP - add lateral tap downs     Other Objective/Functional Measures:     Hamstring tightness, 90/90 test: (L) 16 deg from neutral, (R) 20 deg from neutral   AROM: 0-130 deg    Pain Level (0-10 scale) post treatment: 0    ASSESSMENT/Changes in Function:   Patient had met LTG#2. Poor eccentric quad and glut control for stair descent req VCs to correct. Patient will continue to benefit from skilled PT services to modify and progress therapeutic interventions, address functional mobility deficits, address ROM deficits, address strength deficits, analyze and address soft tissue restrictions, analyze and cue movement patterns, analyze and modify body mechanics/ergonomics and assess and modify postural abnormalities to attain remaining goals. [x]  See Plan of Care  []  See progress note/recertification  []  See Discharge Summary         Progress towards goals / Updated goals:  Short Term Goals: To be accomplished in  1  weeks:  1. Pt will be independent and compliant with HEP. -Goal met; pt notes compliance (6/14/18)  Long Term Goals: To be accomplished in  8-10  treatments:  1. Patient will increase FOTO score to 73/100 for indications of increased functional mobility. 2.  Patient will demo B symmetrical AROM knee flexion for symmetry with gait. -Goal progressing; AROM 0-130 deg pre-MT (6/19/18)  3. Patient will demo B symmetrical 90/90 HS flexibility for improved myofascial mobility with return to gym activities. -Goal progressing; HS tightness 4 deg from symmetrical via 90/90 flexibility test (6/19/18)  4.   Patient will report return to bike riding at the gym for progression to PLOF and decreased fear avoidance.  -Goal met; pt notes bike riding 5 minutes pain-free (6/19/18)    PLAN  [x]  Upgrade activities as tolerated     [x]  Continue plan of care  [x]  Update interventions per flow sheet       []  Discharge due to:_  [x]  Other: progress functional quad strength    Glenn Mendoza, PTA 6/19/2018

## 2018-06-21 ENCOUNTER — HOSPITAL ENCOUNTER (OUTPATIENT)
Dept: PHYSICAL THERAPY | Age: 78
Discharge: HOME | End: 2018-06-21
Payer: MEDICARE

## 2018-06-21 PROCEDURE — 97110 THERAPEUTIC EXERCISES: CPT

## 2018-06-21 PROCEDURE — 97140 MANUAL THERAPY 1/> REGIONS: CPT

## 2018-06-21 NOTE — PROGRESS NOTES
PT DAILY TREATMENT NOTE     Patient Name: René Moore  Date:2018  : 1940  [x]  Patient  Verified  Payor: Danyel Salazar / Plan: [place] [place] [facility] [place] / Product Type: Medicare /    In time: 1:00 pm    Out time: 2:00 pm  Total Treatment Time (min): 60  Total Timed Codes (min): 50  1:1 Treatment Time (min): 45  Visit #: 5 of 8-10    Treatment Area: Right knee pain [M25.561]    SUBJECTIVE  Pain Level (0-10 scale): 0  Any medication changes, allergies to medications, adverse drug reactions, diagnosis change, or new procedure performed?: [x] No    [] Yes (see summary sheet for update)  Subjective functional status/changes:   [] No changes reported  \"I did 6 minutes on the bike and felt fine, just winded, but I guess that's normal for me. \"    OBJECTIVE  Modality rationale: decrease pain and increase tissue extensibility to improve the patients ability to decrease post treatment soreness    Min Type Additional Details    [] Estim: []Att   []Unatt        []TENS instruct                  []IFC  []Premod   []NMES                     []Other:  []w/US   []w/ice   []w/heat  Position:  Location:    []  Traction: [] Cervical       []Lumbar                       [] Prone          []Supine                       []Intermittent   []Continuous Lbs:  [] before manual  [] after manual    []  Ultrasound: []Continuous   [] Pulsed                           []1MHz   []3MHz Location:  W/cm2:    []  Iontophoresis with dexamethasone         Location: [] Take home patch   [] In clinic   10 []  Ice     [x]  heat  []  Ice massage Position: prone   Location: posterior leg     []  Vasopneumatic Device Pressure:       [] lo [] med [] hi   Temperature: [] lo [] med [] hi   [x] Skin assessment post-treatment:  [x]intact []redness- no adverse reaction       []redness - adverse reaction:     39 min Therapeutic Exercise:  [x] See flow sheet: added modified Bhaskar stretch, TA bridge, and TA bridge march   Rationale: increase ROM, increase strength, improve coordination and improve balance to improve the patients ability to improve mobility with all activities. 11 min Manual Therapy:  supine 2-3 joint mobs for (R) knee flexion, passive manual HS stretch, PROM knee flexion; PNF CR for calf stretching    Rationale: decrease pain, increase ROM and increase tissue extensibility to improve mobility and progression to PLOF. X min Patient Education: [x] Review HEP - add mod Bhaskar stretch and bridge march     Other Objective/Functional Measures:    Bridge: 75%    Pain Level (0-10 scale) post treatment: 0    ASSESSMENT/Changes in Function:   Patient demos mild hamstring inhibition with repetitive knee flexion req prolonged holds / progressive reps to fatigue [address] prior to HS involvement. Knee mobility WNL/WFL with GSC tightness noted. Patient will continue to benefit from skilled PT services to modify and progress therapeutic interventions, address functional mobility deficits, address ROM deficits, address strength deficits, analyze and address soft tissue restrictions, analyze and cue movement patterns, analyze and modify body mechanics/ergonomics and assess and modify postural abnormalities to attain remaining goals. [x]  See Plan of Care  []  See progress note/recertification  []  See Discharge Summary         Progress towards goals / Updated goals:  Short Term Goals: To be accomplished in  1  weeks:  1. Pt will be independent and compliant with HEP. -Goal met; pt notes compliance (6/14/18)  Long Term Goals: To be accomplished in  8-10  treatments:  1. Patient will increase FOTO score to 73/100 for indications of increased functional mobility. 2.  Patient will demo B symmetrical AROM knee flexion for symmetry with gait. -Goal progressing; AROM 0-130 deg pre-MT (6/19/18)  3. Patient will demo B symmetrical 90/90 HS flexibility for improved myofascial mobility with return to gym activities.  -Goal progressing; HS tightness 4 deg from symmetrical via 90/90 flexibility test (6/19/18)  4. Patient will report return to bike riding at the gym for progression to PLOF and decreased fear avoidance.  -Goal met; pt notes bike riding 6 minutes pain-free (6/21/18)    PLAN  [x]  Upgrade activities as tolerated     [x]  Continue plan of care  [x]  Update interventions per flow sheet       []  Discharge due to:_  [x]  Other: assess response to progressed HEP    James Quinones, PTA 6/21/2018

## 2018-06-26 ENCOUNTER — HOSPITAL ENCOUNTER (OUTPATIENT)
Dept: PHYSICAL THERAPY | Age: 78
Discharge: HOME | End: 2018-06-26
Payer: MEDICARE

## 2018-06-26 PROCEDURE — 97140 MANUAL THERAPY 1/> REGIONS: CPT

## 2018-06-26 PROCEDURE — 97110 THERAPEUTIC EXERCISES: CPT

## 2018-06-26 NOTE — PROGRESS NOTES
PT DAILY TREATMENT NOTE     Patient Name: Zuhair Barrera  Date:2018  : 1940  [x]  Patient  Verified  Payor: Margareth Plain / Plan: [place] PART [place] [facility] [place] / Product Type: Medicare /    In time: 11:00 am       Out time: 12:01 pm  Total Treatment Time (min): 61  Total Timed Codes (min): 50  1:1 Treatment Time (min): 35  Visit #: 6 of 8-10    Treatment Area: Right knee pain [M25.561]    SUBJECTIVE  Pain Level (0-10 scale): 0  Any medication changes, allergies to medications, adverse drug reactions, diagnosis change, or new procedure performed?: [x] No    [] Yes (see summary sheet for update)  Subjective functional status/changes:   [] No changes reported  \"I rode 9 minutes on the bike. I still feel it (*pain*) when I bend my knee all the way. \"    OBJECTIVE  Modality rationale: decrease pain and increase tissue extensibility to improve the patients ability to decrease post treatment soreness    Min Type Additional Details    [] Estim: []Att   []Unatt        []TENS instruct                  []IFC  []Premod   []NMES                     []Other:  []w/US   []w/ice   []w/heat  Position:  Location:    []  Traction: [] Cervical       []Lumbar                       [] Prone          []Supine                       []Intermittent   []Continuous Lbs:  [] before manual  [] after manual    []  Ultrasound: []Continuous   [] Pulsed                           []1MHz   []3MHz Location:  W/cm2:    []  Iontophoresis with dexamethasone         Location: [] Take home patch   [] In clinic   10 []  Ice     [x]  heat  []  Ice massage Position: prone   Location: posterior leg     []  Vasopneumatic Device Pressure:       [] lo [] med [] hi   Temperature: [] lo [] med [] hi   [x] Skin assessment post-treatment:  [x]intact []redness- no adverse reaction       []redness - adverse reaction:     39 min Therapeutic Exercise:  [x] See flow sheet: added VMO extensions   Rationale: increase ROM, increase strength, improve coordination and improve balance to improve the patients ability to improve mobility with all activities. 12 min Manual Therapy:  supine 2-3 joint mobs for (R) knee flexion, passive manual HS stretch, PROM knee flexion; medial hamstring release   Rationale: decrease pain, increase ROM and increase tissue extensibility to improve mobility and progression to PLOF. X min Patient Education: [x] Review HEP     Other Objective/Functional Measures:    Bike tolerance: 9 minutes  (+) lateral patellar tracking R>L    Pain Level (0-10 scale) post treatment: 0    ASSESSMENT/Changes in Function:   Patient demos improving hamstring tightness. Req VCs for task management. Knee mobility WNL with soft-tissue approx. Added VMO extension to address lateral tracking. Patient will continue to benefit from skilled PT services to modify and progress therapeutic interventions, address functional mobility deficits, address ROM deficits, address strength deficits, analyze and address soft tissue restrictions, analyze and cue movement patterns, analyze and modify body mechanics/ergonomics and assess and modify postural abnormalities to attain remaining goals. [x]  See Plan of Care  []  See progress note/recertification  []  See Discharge Summary         Progress towards goals / Updated goals:  Short Term Goals: To be accomplished in  1  weeks:  1. Pt will be independent and compliant with HEP. -Goal met; pt notes compliance (6/14/18)  Long Term Goals: To be accomplished in  8-10  treatments:  1. Patient will increase FOTO score to 73/100 for indications of increased functional mobility. 2.  Patient will demo B symmetrical AROM knee flexion for symmetry with gait. -Goal progressing; AROM 0-130 deg pre-MT (6/19/18)  3. Patient will demo B symmetrical 90/90 HS flexibility for improved myofascial mobility with return to gym activities.  -Goal progressing; HS tightness 4 deg from symmetrical via 90/90 flexibility test (6/19/18) - improving flexibility with hamstring stretching (6/26/18)  4. Patient will report return to bike riding at the gym for progression to PLOF and decreased fear avoidance.  -Goal met; pt notes bike riding 9 minutes pain-free (6/26/18)    PLAN  [x]  Upgrade activities as tolerated     [x]  Continue plan of care  [x]  Update interventions per flow sheet       []  Discharge due to:_  []  Other:_    Jaison Davis, MATEO 6/26/2018

## 2018-06-28 ENCOUNTER — HOSPITAL ENCOUNTER (OUTPATIENT)
Dept: PHYSICAL THERAPY | Age: 78
Discharge: HOME | End: 2018-06-28
Payer: MEDICARE

## 2018-06-28 PROCEDURE — 97140 MANUAL THERAPY 1/> REGIONS: CPT

## 2018-06-28 PROCEDURE — 97110 THERAPEUTIC EXERCISES: CPT

## 2018-06-28 NOTE — PROGRESS NOTES
PT DAILY TREATMENT NOTE     Patient Name: Ariane Cem  Date:2018  : 1940  [x]  Patient  Verified  Payor: Lisa Breath / Plan: [place] [facility] [place] / Product Type: Medicare /    In time: 8:00 am       Out time: 9:13 am  Total Treatment Time (min): 73  Total Timed Codes (min): 63  1:1 Treatment Time (min): 53  Visit #: 7 of 8-10    Treatment Area: Right knee pain [M25.561]    SUBJECTIVE  Pain Level (0-10 scale): 0  Any medication changes, allergies to medications, adverse drug reactions, diagnosis change, or new procedure performed?: [x] No    [] Yes (see summary sheet for update)  Subjective functional status/changes:   [] No changes reported  \"I am afraid to try to get down to the floor. \"    OBJECTIVE  Modality rationale: decrease pain and increase tissue extensibility to improve the patients ability to decrease post treatment soreness    Min Type Additional Details    [] Estim: []Att   []Unatt        []TENS instruct                  []IFC  []Premod   []NMES                     []Other:  []w/US   []w/ice   []w/heat  Position:  Location:    []  Traction: [] Cervical       []Lumbar                       [] Prone          []Supine                       []Intermittent   []Continuous Lbs:  [] before manual  [] after manual    []  Ultrasound: []Continuous   [] Pulsed                           []1MHz   []3MHz Location:  W/cm2:    []  Iontophoresis with dexamethasone         Location: [] Take home patch   [] In clinic   10 []  Ice     [x]  heat  []  Ice massage Position: prone   Location: posterior leg     []  Vasopneumatic Device Pressure:       [] lo [] med [] hi   Temperature: [] lo [] med [] hi   [x] Skin assessment post-treatment:  [x]intact []redness- no adverse reaction       []redness - adverse reaction:     52 min Therapeutic Exercise:  [x] See flow sheet: assess std<>floor<>kneeling tx   Rationale: increase ROM, increase strength, improve coordination and improve balance to improve the patients ability to improve mobility with all activities. 11 min Manual Therapy:  supine 2-3 joint mobs for (R) knee flexion, passive manual HS stretch, PROM knee flexion; medial hamstring  STM; S/L (R) ITB stretch   Rationale: decrease pain, increase ROM and increase tissue extensibility to improve mobility and progression to PLOF. X min Patient Education: [x] Review HEP     Other Objective/Functional Measures:    (R) knee AROM: WNL with soft tissue approx and normal end-feel  Pt does note intermittent popping when descending stairs, but able to demo safe, reciprocal and normal stair negotiation x 2  (+) lateral patellar tracking bilaterally    Pain Level (0-10 scale) post treatment: 0    ASSESSMENT/Changes in Function:   Patient has met LTG#2. Patient will continue to benefit from skilled PT services to modify and progress therapeutic interventions, address functional mobility deficits, address ROM deficits, address strength deficits, analyze and address soft tissue restrictions, analyze and cue movement patterns, analyze and modify body mechanics/ergonomics and assess and modify postural abnormalities to attain remaining goals. [x]  See Plan of Care  []  See progress note/recertification  []  See Discharge Summary         Progress towards goals / Updated goals:  Short Term Goals: To be accomplished in  1  weeks:  1. Pt will be independent and compliant with HEP. -Goal met; pt notes compliance (6/14/18)  Long Term Goals: To be accomplished in  8-10  treatments:  1. Patient will increase FOTO score to 73/100 for indications of increased functional mobility. 2.  Patient will demo B symmetrical AROM knee flexion for symmetry with gait. -Goal met; AROM 0-130 deg pre-MT (6/28/18)  3. Patient will demo B symmetrical 90/90 HS flexibility for improved myofascial mobility with return to gym activities.  -Goal progressing; HS tightness 4 deg from symmetrical via 90/90 flexibility test (6/19/18) - improving flexibility with hamstring stretching (6/26/18)  4. Patient will report return to bike riding at the gym for progression to PLOF and decreased fear avoidance.  -Goal met; pt notes bike riding 10 minutes pain-free (6/28/18)    PLAN  [x]  Upgrade activities as tolerated     [x]  Continue plan of care  [x]  Update interventions per flow sheet       []  Discharge due to:_  [x]  Other: patient scheduled for 2 additional sessions    Rosa Oquendo PTA 6/28/2018

## 2018-07-03 ENCOUNTER — HOSPITAL ENCOUNTER (OUTPATIENT)
Dept: PHYSICAL THERAPY | Age: 78
Discharge: HOME | End: 2018-07-03
Payer: MEDICARE

## 2018-07-03 PROCEDURE — 97140 MANUAL THERAPY 1/> REGIONS: CPT

## 2018-07-03 PROCEDURE — 97110 THERAPEUTIC EXERCISES: CPT

## 2018-07-03 NOTE — PROGRESS NOTES
PT DAILY TREATMENT NOTE     Patient Name: Dallin Quesada  Date:7/3/2018  : 1940  [x]  Patient  Verified  Payor: [facility] MEDICARE / Plan: [facility] MEDICARE PART A & B / Product Type: Medicare /    In time: 9:30 am       Out time: 10:35 am  Total Treatment Time (min): 65  Total Timed Codes (min): 55  1:1 Treatment Time (min): 39  Visit #: 8 of 8-10    Treatment Area: Right knee pain [M25.561]    SUBJECTIVE  Pain Level (0-10 scale): 0  Any medication changes, allergies to medications, adverse drug reactions, diagnosis change, or new procedure performed?: [x] No    [] Yes (see summary sheet for update)  Subjective functional status/changes:   [] No changes reported  \"I have more of an awareness, but I am okay. Most of my pain is at the side of my knee when I bend too far. \"     OBJECTIVE  Modality rationale: decrease pain and increase tissue extensibility to improve the patients ability to decrease post treatment soreness    Min Type Additional Details    [] Estim: []Att   []Unatt        []TENS instruct                  []IFC  []Premod   []NMES                     []Other:  []w/US   []w/ice   []w/heat  Position:  Location:    []  Traction: [] Cervical       []Lumbar                       [] Prone          []Supine                       []Intermittent   []Continuous Lbs:  [] before manual  [] after manual    []  Ultrasound: []Continuous   [] Pulsed                           []1MHz   []3MHz Location:  W/cm2:    []  Iontophoresis with dexamethasone         Location: [] Take home patch   [] In clinic   10 []  Ice     [x]  heat  []  Ice massage Position: prone   Location: posterior leg     []  Vasopneumatic Device Pressure:       [] lo [] med [] hi   Temperature: [] lo [] med [] hi   [x] Skin assessment post-treatment:  [x]intact []redness- no adverse reaction       []redness - adverse reaction:     43 min Therapeutic Exercise:  [x] See flow sheet: added clams with GTB   Rationale: increase ROM, increase strength, improve coordination and improve balance to improve the patients ability to improve mobility with all activities. 12 min Manual Therapy:  supine 2-3 joint mobs for (R) knee flexion, passive manual HS stretch, lateral chain and ITB CFM f/b medial patellar mobs   Rationale: decrease pain, increase ROM and increase tissue extensibility to improve mobility and progression to PLOF. X min Patient Education: [x] Review HEP     Other Objective/Functional Measures:    Subjective improvement of 90% in sx reported. Pain Level (0-10 scale) post treatment: 0    ASSESSMENT/Changes in Function:   Patient notes DOMS from last session, therefore, minimal changes to program. Cont's with min posterior chain tightness, but WFL. Improving glut firing pattern with bridging. Initiated clam series for glut med strengthening. Patient will continue to benefit from skilled PT services to modify and progress therapeutic interventions, address functional mobility deficits, address ROM deficits, address strength deficits, analyze and address soft tissue restrictions, analyze and cue movement patterns, analyze and modify body mechanics/ergonomics and assess and modify postural abnormalities to attain remaining goals. [x]  See Plan of Care  []  See progress note/recertification  []  See Discharge Summary         Progress towards goals / Updated goals:  Short Term Goals: To be accomplished in  1  weeks:  1. Pt will be independent and compliant with HEP. -Goal met; pt notes compliance (6/14/18)  Long Term Goals: To be accomplished in  8-10  treatments:  1. Patient will increase FOTO score to 73/100 for indications of increased functional mobility.  -Goal progressing; pt reports 90% subjective improvement in sx since IE (7/3/18)  2. Patient will demo B symmetrical AROM knee flexion for symmetry with gait. -Goal met; AROM 0-130 deg pre-MT (6/28/18)  3.   Patient will demo B symmetrical 90/90 HS flexibility for improved myofascial mobility with return to gym activities. -Goal progressing; HS tightness 4 deg from symmetrical via 90/90 flexibility test (6/19/18) - improving flexibility with hamstring stretching (6/26/18)  4. Patient will report return to bike riding at the gym for progression to PLOF and decreased fear avoidance.  -Goal met; pt notes bike riding 10 minutes pain-free (6/28/18)    PLAN  [x]  Upgrade activities as tolerated     [x]  Continue plan of care  [x]  Update interventions per flow sheet       []  Discharge due to:_  [x]  Other: assess goals for PN NV; possible D/C    Katia Abrams, PTA 7/3/2018

## 2018-07-05 ENCOUNTER — HOSPITAL ENCOUNTER (OUTPATIENT)
Dept: PHYSICAL THERAPY | Age: 78
Discharge: HOME | End: 2018-07-05
Payer: MEDICARE

## 2018-07-05 PROCEDURE — G8980 MOBILITY D/C STATUS: HCPCS

## 2018-07-05 PROCEDURE — 97110 THERAPEUTIC EXERCISES: CPT

## 2018-07-05 PROCEDURE — G8979 MOBILITY GOAL STATUS: HCPCS

## 2018-07-05 PROCEDURE — 97140 MANUAL THERAPY 1/> REGIONS: CPT

## 2018-07-05 NOTE — PROGRESS NOTES
PT DAILY TREATMENT NOTE     Patient Name: Jenaro Jang  Date:2018  : 1940  [x]  Patient  Verified  Payor: Lukas Talamantes / Plan: [facility] [place] [place] & B / Product Type: Medicare /    In time: 11:00 am       Out time: 12:02 pm  Total Treatment Time (min): 62  Total Timed Codes (min): 52  1:1 Treatment Time (min): 41  Visit #: 9 of 8-10    Treatment Area: Right knee pain [M25.561]    SUBJECTIVE  Pain Level (0-10 scale): 0  Any medication changes, allergies to medications, adverse drug reactions, diagnosis change, or new procedure performed?: [x] No    [] Yes (see summary sheet for update)  Subjective functional status/changes:   [] No changes reported  \"Do you think I can cancel my follow up with my doctor since I am doing so well?       OBJECTIVE  Modality rationale: decrease pain and increase tissue extensibility to improve the patients ability to decrease post treatment soreness    Min Type Additional Details    [] Estim: []Att   []Unatt        []TENS instruct                  []IFC  []Premod   []NMES                     []Other:  []w/US   []w/ice   []w/heat  Position:  Location:    []  Traction: [] Cervical       []Lumbar                       [] Prone          []Supine                       []Intermittent   []Continuous Lbs:  [] before manual  [] after manual    []  Ultrasound: []Continuous   [] Pulsed                           []1MHz   []3MHz Location:  W/cm2:    []  Iontophoresis with dexamethasone         Location: [] Take home patch   [] In clinic   10 []  Ice     [x]  heat  []  Ice massage Position: prone   Location: posterior leg     []  Vasopneumatic Device Pressure:       [] lo [] med [] hi   Temperature: [] lo [] med [] hi   [x] Skin assessment post-treatment:  [x]intact []redness- no adverse reaction       []redness - adverse reaction:     41 min Therapeutic Exercise:  [x] See flow sheet: assisted pt with FOTO completion   Rationale: increase ROM, increase strength, improve coordination and improve balance to improve the patients ability to improve mobility with all activities. 11 min Manual Therapy:  Reassess; lateral HS release; lateral chain and ITB CFM f/b medial patellar mobs; PROM   Rationale: decrease pain, increase ROM and increase tissue extensibility to improve mobility and progression to PLOF. X min Patient Education: [x] Review HEP - advised it is pt choice to f/u with MD     Other Objective/Functional Measures:    Subjective improvement of 97% in symptoms since IE reported. Improvements: \"everything\", walking tolerance, ease with car transfers, tub transfers, riding bike, hopping, jogging  FOTO score: 99/100 (at last assessment, 72/100)  Knee A/PROM: WNL and symmetrical, pain-free with soft tissue approx  Core strength: 100% bridge  Hip strength: flex 5/5, abd 4+/5, ext 4+/5  Knee strength: flex 4+/5, ext 5/5  Pain: (B) 0, (W) 0-1    Pain Level (0-10 scale) post treatment: 0    ASSESSMENT/Changes in Function:   See D/C. Patient will continue to benefit from skilled PT services to modify and progress therapeutic interventions, address functional mobility deficits, address ROM deficits, address strength deficits, analyze and address soft tissue restrictions, analyze and cue movement patterns, analyze and modify body mechanics/ergonomics and assess and modify postural abnormalities to attain remaining goals. [x]  See Discharge Summary         Progress towards goals / Updated goals:  1. Patient will increase FOTO score to 73/100 for indications of increased functional mobility.  -Goal met; inc to 99/100 from 72/100 at IE  2. Patient will demo B symmetrical AROM knee flexion for symmetry with gait. -Goal met; AROM 0-130 deg pre-MT, WNL and symmetrical (0-122 deg at IE)  3.  Patient will demo B symmetrical 90/90 HS flexibility for improved myofascial mobility with return to gym activities.  -Goal met; pt demos symmetrical HS flexibility via 90/90 testing 0 degrees (R 11 deg at IE)  4. Patient will report return to bike riding at the gym for progression to PLOF and decreased fear avoidance.  -Goal met; pt notes bike riding 10 minutes pain-free    PLAN  [x]  Discharge due to: pt meeting SYMONE Moore, PTA 7/5/2018

## 2018-07-06 NOTE — PROGRESS NOTES
[address] [facility] [address] [facility] AT [address], [place], [place]  Phone: [phone] Fax [phone] SUMMARY  Patient Name: Jesus Benson : 1940   Treatment/Medical Diagnosis: Right knee pain [M25.561]   Referral Source: Mitch Hoover MD     Date of Initial Visit: 18 Attended Visits: 9 Missed Visits: 0     SUMMARY OF TREATMENT  Pt is a 78y.o. year old female who presents with co R knee/ posterior leg pain after a mild fall onto the knee and \"pulling\" the knee when trying to get boots off in 2018. Treatment included progressive therex for flexibility and strength, manual and modalities. CURRENT STATUS  Patient mad excellent progress with PT. Patient has all the tools and knowledge needed to continue with progress via HEP. Assessment as follows:  Subjective improvement of 97% in symptoms since IE reported. Improvements: \"everything\", walking tolerance, ease with car transfers, tub transfers, riding bike, hopping, jogging  FOTO score: 99/100 (at last assessment, 72/100)  Knee A/PROM: WNL and symmetrical, pain-free with soft tissue approx  Core strength: 100% bridge  Hip strength: flex 5/5, abd 4+/5, ext 4+/5  Knee strength: flex 4+/5, ext 5/5  Pain: (B) 0, (W) 0-1  Progress towards goals / Updated goals:  1.  Patient will increase FOTO score to 73/100 for indications of increased functional mobility.  -Goal met; inc to 99/100 from 72/100 at IE  2.  Patient will demo B symmetrical AROM knee flexion for symmetry with gait. -Goal met; AROM 0-130 deg pre-MT, WNL and symmetrical (0-122 deg at IE)  3.  Patient will demo B symmetrical 90/90 HS flexibility for improved myofascial mobility with return to gym activities. -Goal met; pt demos symmetrical HS flexibility via 90/90 testing 0 degrees (R 11 deg at IE)  4. Patient will report return to bike riding at the gym for progression to PLOF and decreased fear avoidance.  -Goal met; pt notes bike riding 10 minutes pain-free (not attempted at IE)  RECOMMENDATIONS  Discontinue therapy. Progressing towards or have reached established goals. Gcode: Mobility:   Goal  CJ= 20-39%  D/C  CI= 1-19%. The severity rating is based on the FOTO Score  If you have any questions/comments please contact us directly at [phone]. Thank you for allowing us to assist in the care of your patient.   Therapist Signature: Johann Zimmer PT Date: 7/6/18   Reporting Period: 6/6/18-7/5/18 Time: 6:41 AM

## 2018-09-18 ENCOUNTER — DOCUMENTATION ONLY (OUTPATIENT)
Dept: INTERNAL MEDICINE CLINIC | Age: 78
End: 2018-09-18

## 2018-09-18 ENCOUNTER — OFFICE VISIT (OUTPATIENT)
Dept: INTERNAL MEDICINE CLINIC | Age: 78
End: 2018-09-18

## 2018-09-18 VITALS
WEIGHT: 160.2 LBS | RESPIRATION RATE: 18 BRPM | HEART RATE: 68 BPM | BODY MASS INDEX: 26.69 KG/M2 | DIASTOLIC BLOOD PRESSURE: 61 MMHG | TEMPERATURE: 96.8 F | HEIGHT: 65 IN | SYSTOLIC BLOOD PRESSURE: 141 MMHG | OXYGEN SATURATION: 100 %

## 2018-09-18 DIAGNOSIS — I47.1 SVT (SUPRAVENTRICULAR TACHYCARDIA) (HCC): Primary | ICD-10-CM

## 2018-09-18 DIAGNOSIS — Z83.3 FH: DIABETES MELLITUS: ICD-10-CM

## 2018-09-18 DIAGNOSIS — Z23 ENCOUNTER FOR IMMUNIZATION: ICD-10-CM

## 2018-09-18 DIAGNOSIS — E78.5 HYPERLIPIDEMIA, UNSPECIFIED HYPERLIPIDEMIA TYPE: ICD-10-CM

## 2018-09-18 DIAGNOSIS — R73.01 IFG (IMPAIRED FASTING GLUCOSE): ICD-10-CM

## 2018-09-18 DIAGNOSIS — I10 ESSENTIAL HYPERTENSION: ICD-10-CM

## 2018-09-18 RX ORDER — LISINOPRIL 10 MG/1
10 TABLET ORAL DAILY
Qty: 90 TAB | Refills: 3 | Status: SHIPPED | OUTPATIENT
Start: 2018-09-18 | End: 2019-07-24 | Stop reason: SDUPTHER

## 2018-09-18 NOTE — PROGRESS NOTES
HISTORY OF PRESENT ILLNESS Wally Funez is a 78 y.o. female. HPI Comments: 77 yo female here to establish care, f/u of SVT,HLD, HTN.  
SVT 2 years ago. Seen in ED treated with adenosine x 2. Causes some anxiety but no sig palpitations. Seen by cardiology annually. Had event monitor x 2 in past. Occasionally notes 'flutter'. Pacemaker and ablation have been discussed. HTN well controlled. Checks at home HLD well controlled on Crestor. No sig myalgias. 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 ? IBS Trying to work on weight loss. Exercises regularly Review of Systems Constitutional: Negative for chills, fever and weight loss. HENT: Negative for congestion and ear pain. Eyes: Negative for blurred vision and pain. Respiratory: Negative for cough and shortness of breath. Cardiovascular: Negative for chest pain, palpitations and leg swelling. Gastrointestinal: Negative for blood in stool, melena, nausea and vomiting. Genitourinary: Negative for frequency and urgency. Musculoskeletal: Negative for joint pain and myalgias. Skin: Negative for itching and rash. Neurological: Negative for dizziness, tingling and headaches. Psychiatric/Behavioral: Negative for depression. The patient is not nervous/anxious. Past Medical History:  
Diagnosis Date  Cancer ([facility] [address] skin  Hyperlipemia  Hypertension  Ill-defined condition H/O LOW WBC'S; MB CELL LYMPHTOSIS  
 SVT (supraventricular tachycardia) ([facility] [address] 01/2017  
 has low heart rate when resting Past Surgical History:  
Procedure Laterality Date  HX APPENDECTOMY  HX GYN    
 rectocele  HX HEENT    
 TEAR DUCT SX  
 HX ORTHOPAEDIC MENISCUS TEAR Current Outpatient Prescriptions on File Prior to Visit Medication Sig Dispense Refill  CHOLECALCIFEROL, VITAMIN D3, (VITAMIN D3 PO) Take  by mouth.  Vitamins-Lipotropics 200-100 mg tab Take  by mouth.  COQ10, LIPOSOMAL UBIQUINOL, PO Take  by mouth.  omega-3 fatty acids-vitamin e (FISH OIL) 1,000 mg cap Take 1 Cap by mouth daily.  calcium & magnesium carbonates 311-232 mg per tablet Take 1 Tab by mouth daily.  MULTIVITAMIN WITH MINERALS (MULTIVITAMIN & MINERAL FORMULA PO) Take 1 Tab by mouth daily.  lisinopril (PRINIVIL, ZESTRIL) 10 mg tablet Take 10 mg by mouth daily.  rosuvastatin (CRESTOR) 5 mg tablet Take 5 mg by mouth nightly. No current facility-administered medications on file prior to visit. Allergies Allergen Reactions  Sulfa (Sulfonamide Antibiotics) Swelling History reviewed. No pertinent family history. Social History Substance Use Topics  Smoking status: Former Smoker  Smokeless tobacco: Never Used  Alcohol use Yes Comment: occasionally Physical Exam  
Constitutional: She appears well-developed and well-nourished. No distress. /61 (BP 1 Location: Left arm, BP Patient Position: Sitting)  Pulse 68  Temp 96.8 °F (36 °C) (Oral)   Resp 18  Ht 5' 5\" (1.651 m)  Wt 160 lb 3.2 oz (72.7 kg)  SpO2 100%  BMI 26.66 kg/m2 HENT:  
Right Ear: Tympanic membrane and ear canal normal.  
Left Ear: Tympanic membrane and ear canal normal.  
Eyes: EOM are normal. Right eye exhibits no discharge. Left eye exhibits no discharge. No scleral icterus. Neck: Neck supple. Cardiovascular: Normal rate, regular rhythm and normal heart sounds. Exam reveals no gallop and no friction rub. No murmur heard. Pulmonary/Chest: Effort normal and breath sounds normal. No respiratory distress. She has no wheezes. She has no rales. Abdominal: Soft. She exhibits no distension. There is no tenderness. There is no rebound. Musculoskeletal: She exhibits no edema or tenderness. Lymphadenopathy:  
  She has no cervical adenopathy. Neurological: She is alert. She exhibits normal muscle tone. Skin: Skin is warm and dry. Psychiatric: She has a normal mood and affect. Lab Results Component Value Date/Time Sodium 137 05/07/2011 04:00 AM  
 Potassium 4.3 05/07/2011 04:00 AM  
 Chloride 105 05/07/2011 04:00 AM  
 CO2 28 05/07/2011 04:00 AM  
 Anion gap 4 (L) 05/07/2011 04:00 AM  
 Glucose 126 (H) 05/07/2011 04:00 AM  
 BUN 16 05/07/2011 04:00 AM  
 Creatinine 1.1 05/07/2011 04:00 AM  
 BUN/Creatinine ratio 15 05/07/2011 04:00 AM  
 GFR est AA >60 05/07/2011 04:00 AM  
 GFR est non-AA 53 (L) 05/07/2011 04:00 AM  
 Calcium 8.1 (L) 05/07/2011 04:00 AM  
No results found for: CHOL, CHOLPOCT, CHOLX, CHLST, CHOLV, HDL, HDLPOC, LDL, LDLCPOC, LDLC, DLDLP, VLDLC, VLDL, TGLX, TRIGL, TRIGP, TGLPOCT, CHHD, CHHDX ASSESSMENT and PLAN 
  ICD-10-CM ICD-9-CM 1. SVT (supraventricular tachycardia) (HCC) I47.1 427.89   
2. Essential hypertension B94 161.7 METABOLIC PANEL, COMPREHENSIVE 3. Hyperlipidemia, unspecified hyperlipidemia type E78.5 272.4 LIPID PANEL 4. BMI 26.0-26.9,adult Z68.26 V85.22   
5. Encounter for immunization Z23 V03.89 INFLUENZA VACCINE INACTIVATED (IIV), SUBUNIT, ADJUVANTED, IM  
6. FH: diabetes mellitus Z83.3 V18.0 HEMOGLOBIN A1C WITH EAG Old records requested Continue with current medication. Repeat labs ordered Discussed diet and provided info on AVS. Flu shot today.

## 2018-09-18 NOTE — MR AVS SNAPSHOT
[address] [place] [address] 
 
 
 [address] [facility] [address] [id number] 
[phone] Patient: David Hurst MRN: [id number] RTC:[id number]/25/[id number] Visit Information Date & Time Provider Department Dept. Phone Encounter #  
 9/18/2018 12:45 PM Sally Danya, [address] [phone] [id number] Follow-up Instructions Return in about 4 months (around 1/18/2019), or if symptoms worsen or fail to improve, for hypertension. Upcoming Health Maintenance Date Due  
 GLAUCOMA SCREENING Q2Y 11/25/2007 Bone Densitometry (Dexa) Screening 11/25/2007 MEDICARE YEARLY EXAM 3/20/2018 Influenza Age 5 to Adult 8/1/2018 DTaP/Tdap/Td series (2 - Td) 12/9/2019 Allergies as of 9/18/2018  Review Complete On: 9/18/2018 By: Evelina Pereira Severity Noted Reaction Type Reactions Sulfa (Sulfonamide Antibiotics)  11/18/2013    Swelling Current Immunizations  Reviewed on 9/18/2018 Name Date Influenza High Dose Vaccine PF 11/28/2017 12:00 AM, 9/14/2015 Influenza Vaccine 9/29/2014, 10/27/2013 12:00 AM  
 Influenza Vaccine Intradermal PF 10/14/2016 12:00 AM  
 Pneumococcal Conjugate (PCV-13) 7/15/2015 12:00 AM  
 Pneumococcal Polysaccharide (PPSV-23) 6/27/2017 12:00 AM, 12/9/2009 12:00 AM  
 Tdap 12/9/2009 12:00 AM  
 Zoster Recombinant 8/16/2018, 5/24/2018 Zoster Vaccine, Live 6/1/2006 12:00 AM  
  
 Reviewed by Arun Julien PHARMD on 9/18/2018 at 10:23 AM  
You Were Diagnosed With   
  
 Codes Comments SVT (supraventricular tachycardia) ([facility] [address]    -  Primary ICD-10-CM: I47.1 ICD-9-CM: 427.89 Essential hypertension     ICD-10-CM: I10 
ICD-9-CM: 401.9 Hyperlipidemia, unspecified hyperlipidemia type     ICD-10-CM: E78.5 ICD-9-CM: 272.4 BMI 26.0-26.9,adult     ICD-10-CM: Z47.55 
ICD-9-CM: V85.22 Vitals BP Pulse Temp Resp Height(growth percentile) Weight(growth percentile) 141/61 (BP 1 Location: Left arm, BP Patient Position: Sitting) 68 96.8 °F (36 °C) (Oral) 18 5' 5\" (1.651 m) 160 lb 3.2 oz (72.7 kg) SpO2 BMI OB Status Smoking Status 100% 26.66 kg/m2 Postmenopausal Former Smoker BMI and BSA Data Body Mass Index Body Surface Area  
 26.66 kg/m 2 1.83 m 2 Preferred Pharmacy Pharmacy Name Phone  [address] [phone] Your Updated Medication List  
  
   
This list is accurate as of 9/18/18  1:44 PM.  Always use your most recent med list.  
  
  
  
  
 ANTIOXIDANT Cap Generic drug:  vitamin a-vitamin c-vitamin e  
1 po qd  
  
 calcium and magnesium carbonat 311-232 mg per tablet Take 1 Tab by mouth daily. COQ10 (LIPOSOMAL UBIQUINOL) PO Take  by mouth. CRESTOR 5 mg tablet Generic drug:  rosuvastatin Take 5 mg by mouth nightly. FISH OIL 1,000 mg Cap Generic drug:  omega-3 fatty acids-vitamin e Take 1 Cap by mouth daily. lisinopril 10 mg tablet Commonly known as:  Patricia Pinch Take 1 Tab by mouth daily. MULTIVITAMIN & MINERAL FORMULA PO Take 1 Tab by mouth daily. VITAMIN D3 PO Take  by mouth. Vitamins-Lipotropics 200-100 mg Tab Take  by mouth. Prescriptions Sent to Pharmacy Refills  
 lisinopril (PRINIVIL, ZESTRIL) 10 mg tablet 3 Sig: Take 1 Tab by mouth daily. Class: Normal  
 Pharmacy: [facility] [address] [place] Ph #: [phone] Route: Oral  
  
Follow-up Instructions Return in about 4 months (around 1/18/2019), or if symptoms worsen or fail to improve, for hypertension. Patient Instructions Try to keep food choices to 20% of calories or less from fat (grams of fat x 9, divide by total calories) Choose foods the way they are found in nature rather than processed. Eating Healthy Foods: Care Instructions Your Care Instructions Eating healthy foods can help lower your risk for disease. Healthy food gives you energy and keeps your heart strong, your brain active, your muscles working, and your bones strong. A healthy diet includes a variety of foods from the basic food groups: grains, vegetables, fruits, milk and milk products, and meat and beans. Some people may eat more of their favorite foods from only one food group and, as a result, miss getting the nutrients they need. So, it is important to pay attention not only to what you eat but also to what you are missing from your diet. You can eat a healthy, balanced diet by making a few small changes. Follow-up care is a key part of your treatment and safety. Be sure to make and go to all appointments, and call your doctor if you are having problems. It's also a good idea to know your test results and keep a list of the medicines you take. How can you care for yourself at home? Look at what you eat · Keep a food diary for a week or two and record everything you eat or drink. Track the number of servings you eat from each food group. · For a balanced diet every day, eat a variety of: ¨ 6 or more ounce-equivalents of grains, such as cereals, breads, crackers, rice, or pasta, every day. An ounce-equivalent is 1 slice of bread, 1 cup of ready-to-eat cereal, or ½ cup of cooked rice, cooked pasta, or cooked cereal. 
¨ 2½ cups of vegetables, especially: § Dark-green vegetables such as broccoli and spinach. § Orange vegetables such as carrots and sweet potatoes. § Dry beans (such as christy and kidney beans) and peas (such as lentils). ¨ 2 cups of fresh, frozen, or canned fruit. A small apple or 1 banana or orange equals 1 cup. ¨ 3 cups of nonfat or low-fat milk, yogurt, or other milk products. ¨ 5½ ounces of meat and beans, such as chicken, fish, lean meat, beans, nuts, and seeds.  One egg, 1 tablespoon of peanut butter, ½ ounce nuts or seeds, or ¼ cup of cooked beans equals 1 ounce of meat. · Learn how to read food labels for serving sizes and ingredients. Fast-food and convenience-food meals often contain few or no fruits or vegetables. Make sure you eat some fruits and vegetables to make the meal more nutritious. · Look at your food diary. For each food group, add up what you have eaten and then divide the total by the number of days. This will give you an idea of how much you are eating from each food group. See if you can find some ways to change your diet to make it more healthy. Start small · Do not try to make dramatic changes to your diet all at once. You might feel that you are missing out on your favorite foods and then be more likely to fail. · Start slowly, and gradually change your habits. Try some of the following: ¨ Use whole wheat bread instead of white bread. ¨ Use nonfat or low-fat milk instead of whole milk. ¨ Eat brown rice instead of white rice, and eat whole wheat pasta instead of white-flour pasta. ¨ Try low-fat cheeses and low-fat yogurt. ¨ Add more fruits and vegetables to meals and have them for snacks. ¨ Add lettuce, tomato, cucumber, and onion to sandwiches. ¨ Add fruit to yogurt and cereal. 
Enjoy food · You can still eat your favorite foods. You just may need to eat less of them. If your favorite foods are high in fat, salt, and sugar, limit how often you eat them, but do not cut them out entirely. · Eat a wide variety of foods. Make healthy choices when eating out · The type of restaurant you choose can help you make healthy choices. Even fast-food chains are now offering more low-fat or healthier choices on the menu. · Choose smaller portions, or take half of your meal home. · When eating out, try: ¨ A veggie pizza with a whole wheat crust or grilled chicken (instead of sausage or pepperoni). ¨ Pasta with roasted vegetables, grilled chicken, or marinara sauce instead of cream sauce. ¨ A vegetable wrap or grilled chicken wrap. ¨ Broiled or poached food instead of fried or breaded items. Make healthy choices easy · Buy packaged, prewashed, ready-to-eat fresh vegetables and fruits, such as baby carrots, salad mixes, and chopped or shredded broccoli and cauliflower. · Buy packaged, presliced fruits, such as melon or pineapple. · Choose 100% fruit or vegetable juice instead of soda. Limit juice intake to 4 to 6 oz (½ to ¾ cup) a day. · Blend low-fat yogurt, fruit juice, and canned or frozen fruit to make a smoothie for breakfast or a snack. Where can you learn more? Go to http://abbey-madeline.info/. Enter T756 in the search box to learn more about \"Eating Healthy Foods: Care Instructions. \" Current as of: May 12, 2017 Content Version: 11.7 © 2829-4064 [facility]. Care instructions adapted under license by [employer] (which disclaims liability or warranty for this information). If you have questions about a medical condition or this instruction, always ask your healthcare professional. [place] [address] any warranty or liability for your use of this information. Introducing [facility]! Dear De Maharaj: Thank you for requesting a Casero account. Our records indicate that you already have an active Casero account. You can access your account anytime at [email or web address] Did you know that you can access your hospital and ER discharge instructions at any time in Casero? You can also review all of your test results from your hospital stay or ER visit. Additional Information If you have questions, please visit the Frequently Asked Questions section of the [employer]ero website at [email or web address] Remember, Casero is NOT to be used for urgent needs. For medical emergencies, dial 911. Now available from your iPhone and Android! Please provide this summary of care documentation to your next provider. Your primary care clinician is listed as Danielle Del Rosario. If you have any questions after today's visit, please call [phone].

## 2018-09-18 NOTE — PROGRESS NOTES
ROOM # 17 Identified pt with two pt identifiers(name and ). Reviewed record in preparation for visit and have obtained necessary documentation. Chief Complaint Patient presents with [employer] Establish Care [address] [place] [address] preferred language for health care discussion is english/other. Is the patient using any DME equipment during OV? NO [address] [place] [address] is due for: 
Health Maintenance Due Topic  GLAUCOMA SCREENING Q2Y  Bone Densitometry (Dexa) Screening  MEDICARE YEARLY EXAM   
 Influenza Age 5 to Adult Health Maintenance reviewed and discussed per provider Please order/place referral if appropriate. Advance Directive: 1. Do you have an advance directive in place? Patient Reply: Jaderyne Simpler 
 
2. If not, would you like material regarding how to put one in place? NO Coordination of Care: 1. Have you been to the ER, urgent care clinic since your last visit? Hospitalized since your last visit? NO 
 
2. Have you seen or consulted any other health care providers outside of the [address] [place] [address] since your last visit? Include any pap smears or colon screening. YES Patient is accompanied by self I have received verbal consent from [address] [place] [address] to discuss any/all medical information while they are present in the room. Learning Assessment: 
No flowsheet data found. Depression Screening: PHQ over the last two weeks 2018 Little interest or pleasure in doing things Nearly every day Feeling down, depressed, irritable, or hopeless Not at all Total Score PHQ 2 3 Abuse Screening: No flowsheet data found. Fall Risk Fall Risk Assessment, last 12 mths 2018 Able to walk? Yes Fall in past 12 months?  No

## 2018-09-18 NOTE — PROGRESS NOTES
Flu vaccine first dose administered in Rt deltoid with patient's consent. Patient observed for 10 minutes, no reaction noted at present time. Patient tolerated procedure well and left without any complaints. Patient received Flu VIS sheet and verbalized understanding.

## 2018-09-18 NOTE — PATIENT INSTRUCTIONS
Try to keep food choices to 20% of calories or less from fat (grams of fat x 9, divide by total calories) Choose foods the way they are found in nature rather than processed. Eating Healthy Foods: Care Instructions Your Care Instructions Eating healthy foods can help lower your risk for disease. Healthy food gives you energy and keeps your heart strong, your brain active, your muscles working, and your bones strong. A healthy diet includes a variety of foods from the basic food groups: grains, vegetables, fruits, milk and milk products, and meat and beans. Some people may eat more of their favorite foods from only one food group and, as a result, miss getting the nutrients they need. So, it is important to pay attention not only to what you eat but also to what you are missing from your diet. You can eat a healthy, balanced diet by making a few small changes. Follow-up care is a key part of your treatment and safety. Be sure to make and go to all appointments, and call your doctor if you are having problems. It's also a good idea to know your test results and keep a list of the medicines you take. How can you care for yourself at home? Look at what you eat · Keep a food diary for a week or two and record everything you eat or drink. Track the number of servings you eat from each food group. · For a balanced diet every day, eat a variety of: ¨ 6 or more ounce-equivalents of grains, such as cereals, breads, crackers, rice, or pasta, every day. An ounce-equivalent is 1 slice of bread, 1 cup of ready-to-eat cereal, or ½ cup of cooked rice, cooked pasta, or cooked cereal. 
¨ 2½ cups of vegetables, especially: § Dark-green vegetables such as broccoli and spinach. § Orange vegetables such as carrots and sweet potatoes. § Dry beans (such as christy and kidney beans) and peas (such as lentils). ¨ 2 cups of fresh, frozen, or canned fruit. A small apple or 1 banana or orange equals 1 cup. ¨ 3 cups of nonfat or low-fat milk, yogurt, or other milk products. ¨ 5½ ounces of meat and beans, such as chicken, fish, lean meat, beans, nuts, and seeds. One egg, 1 tablespoon of peanut butter, ½ ounce nuts or seeds, or ¼ cup of cooked beans equals 1 ounce of meat. · Learn how to read food labels for serving sizes and ingredients. Fast-food and convenience-food meals often contain few or no fruits or vegetables. Make sure you eat some fruits and vegetables to make the meal more nutritious. · Look at your food diary. For each food group, add up what you have eaten and then divide the total by the number of days. This will give you an idea of how much you are eating from each food group. See if you can find some ways to change your diet to make it more healthy. Start small · Do not try to make dramatic changes to your diet all at once. You might feel that you are missing out on your favorite foods and then be more likely to fail. · Start slowly, and gradually change your habits. Try some of the following: ¨ Use whole wheat bread instead of white bread. ¨ Use nonfat or low-fat milk instead of whole milk. ¨ Eat brown rice instead of white rice, and eat whole wheat pasta instead of white-flour pasta. ¨ Try low-fat cheeses and low-fat yogurt. ¨ Add more fruits and vegetables to meals and have them for snacks. ¨ Add lettuce, tomato, cucumber, and onion to sandwiches. ¨ Add fruit to yogurt and cereal. 
Enjoy food · You can still eat your favorite foods. You just may need to eat less of them. If your favorite foods are high in fat, salt, and sugar, limit how often you eat them, but do not cut them out entirely. · Eat a wide variety of foods. Make healthy choices when eating out · The type of restaurant you choose can help you make healthy choices. Even fast-food chains are now offering more low-fat or healthier choices on the menu. · Choose smaller portions, or take half of your meal home. · When eating out, try: ¨ A veggie pizza with a whole wheat crust or grilled chicken (instead of sausage or pepperoni). ¨ Pasta with roasted vegetables, grilled chicken, or marinara sauce instead of cream sauce. ¨ A vegetable wrap or grilled chicken wrap. ¨ Broiled or poached food instead of fried or breaded items. Make healthy choices easy · Buy packaged, prewashed, ready-to-eat fresh vegetables and fruits, such as baby carrots, salad mixes, and chopped or shredded broccoli and cauliflower. · Buy packaged, presliced fruits, such as melon or pineapple. · Choose 100% fruit or vegetable juice instead of soda. Limit juice intake to 4 to 6 oz (½ to ¾ cup) a day. · Blend low-fat yogurt, fruit juice, and canned or frozen fruit to make a smoothie for breakfast or a snack. Where can you learn more? Go to http://abbey-madeline.info/. Enter T756 in the search box to learn more about \"Eating Healthy Foods: Care Instructions. \" Current as of: May 12, 2017 Content Version: 11.7 © 4696-7522 [employer]. Care instructions adapted under license by [employer] (which disclaims liability or warranty for this information). If you have questions about a medical condition or this instruction, always ask your healthcare professional. [place] [address] any warranty or liability for your use of this information.

## 2018-09-19 ENCOUNTER — HOSPITAL ENCOUNTER (OUTPATIENT)
Dept: LAB | Age: 78
Discharge: HOME | End: 2018-09-19
Payer: MEDICARE

## 2018-09-19 DIAGNOSIS — I10 ESSENTIAL HYPERTENSION: ICD-10-CM

## 2018-09-19 DIAGNOSIS — Z83.3 FH: DIABETES MELLITUS: ICD-10-CM

## 2018-09-19 DIAGNOSIS — E78.5 HYPERLIPIDEMIA, UNSPECIFIED HYPERLIPIDEMIA TYPE: ICD-10-CM

## 2018-09-19 LAB
ALBUMIN SERPL-MCNC: 3.7 G/DL (ref 3.4–5)
ALBUMIN/GLOB SERPL: 1.3 {RATIO} (ref 0.8–1.7)
ALP SERPL-CCNC: 82 U/L (ref 45–117)
ALT SERPL-CCNC: 35 U/L (ref 13–56)
ANION GAP SERPL CALC-SCNC: 7 MMOL/L (ref 3–18)
AST SERPL-CCNC: 25 U/L (ref 15–37)
BILIRUB SERPL-MCNC: 0.7 MG/DL (ref 0.2–1)
BUN SERPL-MCNC: 20 MG/DL (ref 7–18)
BUN/CREAT SERPL: 27 (ref 12–20)
CALCIUM SERPL-MCNC: 8.2 MG/DL (ref 8.5–10.1)
CHLORIDE SERPL-SCNC: 102 MMOL/L (ref 100–108)
CHOLEST SERPL-MCNC: 178 MG/DL
CO2 SERPL-SCNC: 27 MMOL/L (ref 21–32)
CREAT SERPL-MCNC: 0.73 MG/DL (ref 0.6–1.3)
EST. AVERAGE GLUCOSE BLD GHB EST-MCNC: 117 MG/DL
GLOBULIN SER CALC-MCNC: 2.9 G/DL (ref 2–4)
GLUCOSE SERPL-MCNC: 99 MG/DL (ref 74–99)
HBA1C MFR BLD: 5.7 % (ref 4.2–5.6)
HDLC SERPL-MCNC: 57 MG/DL (ref 40–60)
HDLC SERPL: 3.1 {RATIO} (ref 0–5)
LDLC SERPL CALC-MCNC: 105.2 MG/DL (ref 0–100)
LIPID PROFILE,FLP: ABNORMAL
POTASSIUM SERPL-SCNC: 4.3 MMOL/L (ref 3.5–5.5)
PROT SERPL-MCNC: 6.6 G/DL (ref 6.4–8.2)
SODIUM SERPL-SCNC: 136 MMOL/L (ref 136–145)
TRIGL SERPL-MCNC: 79 MG/DL (ref ?–150)
VLDLC SERPL CALC-MCNC: 15.8 MG/DL

## 2018-09-19 PROCEDURE — 80061 LIPID PANEL: CPT | Performed by: INTERNAL MEDICINE

## 2018-09-19 PROCEDURE — 36415 COLL VENOUS BLD VENIPUNCTURE: CPT | Performed by: INTERNAL MEDICINE

## 2018-09-19 PROCEDURE — 80053 COMPREHEN METABOLIC PANEL: CPT | Performed by: INTERNAL MEDICINE

## 2018-09-19 PROCEDURE — 83036 HEMOGLOBIN GLYCOSYLATED A1C: CPT | Performed by: INTERNAL MEDICINE

## 2018-09-24 ENCOUNTER — TELEPHONE (OUTPATIENT)
Dept: INTERNAL MEDICINE CLINIC | Age: 78
End: 2018-09-24

## 2018-09-25 NOTE — TELEPHONE ENCOUNTER
Pt contacted at home number. 2 pt identifiers confirmed. Pt informed of results review that will be mailed out and pt states she also saw her labs on my chart. No other questions at this time.

## 2019-01-18 ENCOUNTER — OFFICE VISIT (OUTPATIENT)
Dept: INTERNAL MEDICINE CLINIC | Age: 79
End: 2019-01-18

## 2019-01-18 ENCOUNTER — HOSPITAL ENCOUNTER (OUTPATIENT)
Dept: LAB | Age: 79
Discharge: HOME | End: 2019-01-18
Payer: MEDICARE

## 2019-01-18 VITALS
HEART RATE: 61 BPM | TEMPERATURE: 95.6 F | SYSTOLIC BLOOD PRESSURE: 111 MMHG | RESPIRATION RATE: 20 BRPM | HEIGHT: 65 IN | OXYGEN SATURATION: 95 % | BODY MASS INDEX: 27.06 KG/M2 | DIASTOLIC BLOOD PRESSURE: 59 MMHG | WEIGHT: 162.4 LBS

## 2019-01-18 DIAGNOSIS — R20.0 BILATERAL HAND NUMBNESS: Primary | ICD-10-CM

## 2019-01-18 DIAGNOSIS — R39.15 URGENCY OF URINATION: ICD-10-CM

## 2019-01-18 DIAGNOSIS — I47.1 SVT (SUPRAVENTRICULAR TACHYCARDIA) (HCC): ICD-10-CM

## 2019-01-18 DIAGNOSIS — R73.01 IFG (IMPAIRED FASTING GLUCOSE): ICD-10-CM

## 2019-01-18 DIAGNOSIS — I10 ESSENTIAL HYPERTENSION: ICD-10-CM

## 2019-01-18 DIAGNOSIS — M67.431 GANGLION CYST OF WRIST, RIGHT: ICD-10-CM

## 2019-01-18 LAB
ALBUMIN SERPL-MCNC: 3.9 G/DL (ref 3.4–5)
ALBUMIN/GLOB SERPL: 1.3 {RATIO} (ref 0.8–1.7)
ALP SERPL-CCNC: 77 U/L (ref 45–117)
ALT SERPL-CCNC: 30 U/L (ref 13–56)
ANION GAP SERPL CALC-SCNC: 6 MMOL/L (ref 3–18)
APPEARANCE UR: CLEAR
AST SERPL-CCNC: 25 U/L (ref 15–37)
BILIRUB SERPL-MCNC: 0.7 MG/DL (ref 0.2–1)
BILIRUB UR QL: NEGATIVE
BUN SERPL-MCNC: 14 MG/DL (ref 7–18)
BUN/CREAT SERPL: 17 (ref 12–20)
CALCIUM SERPL-MCNC: 8.1 MG/DL (ref 8.5–10.1)
CHLORIDE SERPL-SCNC: 103 MMOL/L (ref 100–108)
CO2 SERPL-SCNC: 27 MMOL/L (ref 21–32)
COLOR UR: YELLOW
CREAT SERPL-MCNC: 0.83 MG/DL (ref 0.6–1.3)
EST. AVERAGE GLUCOSE BLD GHB EST-MCNC: 126 MG/DL
GLOBULIN SER CALC-MCNC: 3 G/DL (ref 2–4)
GLUCOSE SERPL-MCNC: 83 MG/DL (ref 74–99)
GLUCOSE UR STRIP.AUTO-MCNC: NEGATIVE MG/DL
HBA1C MFR BLD: 6 % (ref 4.2–5.6)
HGB UR QL STRIP: NEGATIVE
KETONES UR QL STRIP.AUTO: NEGATIVE MG/DL
LEUKOCYTE ESTERASE UR QL STRIP.AUTO: NEGATIVE
NITRITE UR QL STRIP.AUTO: NEGATIVE
PH UR STRIP: 5 [PH] (ref 5–8)
POTASSIUM SERPL-SCNC: 3.8 MMOL/L (ref 3.5–5.5)
PROT SERPL-MCNC: 6.9 G/DL (ref 6.4–8.2)
PROT UR STRIP-MCNC: NEGATIVE MG/DL
SODIUM SERPL-SCNC: 136 MMOL/L (ref 136–145)
SP GR UR REFRACTOMETRY: 1.01 (ref 1–1.03)
UROBILINOGEN UR QL STRIP.AUTO: 0.2 EU/DL (ref 0.2–1)

## 2019-01-18 PROCEDURE — 80053 COMPREHEN METABOLIC PANEL: CPT

## 2019-01-18 PROCEDURE — 81003 URINALYSIS AUTO W/O SCOPE: CPT

## 2019-01-18 PROCEDURE — 83036 HEMOGLOBIN GLYCOSYLATED A1C: CPT

## 2019-01-18 NOTE — PROGRESS NOTES
HISTORY OF PRESENT ILLNESS Toy Talamantes is a 70 y.o. female. 71 yo female here for f/u of HTN, IFG, Notes her R hand seems to 'pulse' only when driving and she has hand holding steering wheel. Goes away if she loosens . No pain. Hands will fall asleep at night. Improve if changes positions. Does have wrist splints at home R wrist cyst noticed recently. No pain or impact on function. IFG on past labs. Strong FH DM. Does have some increased urinary urge sx. SVT - had episode of palpitations prior to Syeda. HR up to 170s Review of Systems Constitutional: Negative for chills, fever and weight loss. HENT: Negative for congestion and ear pain. Eyes: Negative for blurred vision and pain. Respiratory: Negative for cough and shortness of breath. Cardiovascular: Negative for chest pain, palpitations and leg swelling. Gastrointestinal: Negative for nausea and vomiting. Genitourinary: Negative for frequency and urgency. Musculoskeletal: Negative for joint pain and myalgias. Skin: Negative for itching and rash. Neurological: Positive for tingling. Negative for dizziness and headaches. Psychiatric/Behavioral: Negative for depression. The patient is not nervous/anxious. Past Medical History:  
Diagnosis Date  Cancer ([facility] [address] skin  Hyperlipemia  Hypertension  Ill-defined condition H/O LOW WBC'S; MB CELL LYMPHTOSIS  
 SVT (supraventricular tachycardia) ([facility] [address] 01/2017  
 has low heart rate when resting Current Outpatient Medications on File Prior to Visit Medication Sig Dispense Refill  vitamin a-vitamin c-vitamin e (ANTIOXIDANT) cap 1 po qd    
 lisinopril (PRINIVIL, ZESTRIL) 10 mg tablet Take 1 Tab by mouth daily. 90 Tab 3  
 CHOLECALCIFEROL, VITAMIN D3, (VITAMIN D3 PO) Take  by mouth.  Vitamins-Lipotropics 200-100 mg tab Take  by mouth.  COQ10, LIPOSOMAL UBIQUINOL, PO Take  by mouth.  omega-3 fatty acids-vitamin e (FISH OIL) 1,000 mg cap Take 1 Cap by mouth daily.  calcium & magnesium carbonates 311-232 mg per tablet Take 1 Tab by mouth daily.  MULTIVITAMIN WITH MINERALS (MULTIVITAMIN & MINERAL FORMULA PO) Take 1 Tab by mouth daily.  rosuvastatin (CRESTOR) 5 mg tablet Take 5 mg by mouth nightly. No current facility-administered medications on file prior to visit. Social History Tobacco Use  Smoking status: Former Smoker  Smokeless tobacco: Never Used Substance Use Topics  Alcohol use: Yes Comment: occasionally  Drug use: No  
 
Physical Exam  
Constitutional: She appears well-developed and well-nourished. No distress. /59 (BP 1 Location: Right arm, BP Patient Position: Sitting)   Pulse 61   Temp 95.6 °F (35.3 °C) (Oral)   Resp 20   Ht 5' 5\" (1.651 m)   Wt 162 lb 6.4 oz (73.7 kg)   SpO2 95%   BMI 27.02 kg/m² Eyes: EOM are normal. Right eye exhibits no discharge. Left eye exhibits no discharge. No scleral icterus. Neck: Neck supple. Cardiovascular: Normal rate, regular rhythm and normal heart sounds. Exam reveals no gallop and no friction rub. No murmur heard. Pulmonary/Chest: Effort normal and breath sounds normal. No respiratory distress. She has no wheezes. She has no rales. Abdominal: Soft. She exhibits no distension. There is no tenderness. There is no rebound. Musculoskeletal: She exhibits no edema or tenderness. Ganglion cyst R wrist  
Lymphadenopathy:  
  She has no cervical adenopathy. Neurological: She is alert. She exhibits normal muscle tone. Skin: Skin is warm and dry. Psychiatric: She has a normal mood and affect. Lab Results Component Value Date/Time  Sodium 136 09/19/2018 08:20 AM  
 Potassium 4.3 09/19/2018 08:20 AM  
 Chloride 102 09/19/2018 08:20 AM  
 CO2 27 09/19/2018 08:20 AM  
 Anion gap 7 09/19/2018 08:20 AM  
 Glucose 99 09/19/2018 08:20 AM  
 BUN 20 (H) 09/19/2018 08:20 AM  
 Creatinine 0.73 09/19/2018 08:20 AM  
 BUN/Creatinine ratio 27 (H) 09/19/2018 08:20 AM  
 GFR est AA >60 09/19/2018 08:20 AM  
 GFR est non-AA >60 09/19/2018 08:20 AM  
 Calcium 8.2 (L) 09/19/2018 08:20 AM  
 Bilirubin, total 0.7 09/19/2018 08:20 AM  
 AST (SGOT) 25 09/19/2018 08:20 AM  
 Alk. phosphatase 82 09/19/2018 08:20 AM  
 Protein, total 6.6 09/19/2018 08:20 AM  
 Albumin 3.7 09/19/2018 08:20 AM  
 Globulin 2.9 09/19/2018 08:20 AM  
 A-G Ratio 1.3 09/19/2018 08:20 AM  
 ALT (SGPT) 35 09/19/2018 08:20 AM  
 
Lab Results Component Value Date/Time Cholesterol, total 178 09/19/2018 08:20 AM  
 HDL Cholesterol 57 09/19/2018 08:20 AM  
 LDL, calculated 105.2 (H) 09/19/2018 08:20 AM  
 VLDL, calculated 15.8 09/19/2018 08:20 AM  
 Triglyceride 79 09/19/2018 08:20 AM  
 CHOL/HDL Ratio 3.1 09/19/2018 08:20 AM  
 
Lab Results Component Value Date/Time WBC 10.6 05/07/2011 04:00 AM  
 HGB 11.7 (L) 05/07/2011 04:00 AM  
 HCT 35.5 05/07/2011 04:00 AM  
 PLATELET 057 99/99/2181 04:00 AM  
 MCV 91.3 05/07/2011 04:00 AM  
 
Lab Results Component Value Date/Time Hemoglobin A1c 5.7 (H) 09/19/2018 08:20 AM  
No results found for: TSH, TSHEXT, TSH2, TSH3, TSHP, TSHELE, TT3, T3U, T3UP, FRT3, FT3, T3T, FT4, FT4P, T4, T4P, FT4T, TT7, R0PDOZICP, TSHEXT 
 
ASSESSMENT and PLAN 
  ICD-10-CM ICD-9-CM 1. Bilateral hand numbness R20.0 782.0 2. Ganglion cyst of wrist, right M67.431 727.41   
3. Essential hypertension V45 492.1 METABOLIC PANEL, COMPREHENSIVE URINALYSIS W/ RFLX MICROSCOPIC 4. Urgency of urination R39.15 788.63 URINALYSIS W/ RFLX MICROSCOPIC 5. SVT (supraventricular tachycardia) (HCC) I47.1 427.89   
6. IFG (impaired fasting glucose) R73.01 790.21 HEMOGLOBIN A1C WITH EAG Repeat labs today. Will continue with current medication for now. Provided info on bladder training on AVS. Has f/u planned with gyn Wishes to hold on referral to hand surgeon for cyst. Discussed trying to wear braces at night to see if this helps with sx

## 2019-01-18 NOTE — PATIENT INSTRUCTIONS
Bladder Training: Care Instructions Your Care Instructions Bladder training is used to treat urge incontinence and stress incontinence. Urge incontinence means that the need to urinate comes on so fast that you can't get to a toilet in time. Stress incontinence means that you leak urine because of pressure on your bladder. For example, it may happen when you laugh, cough, or lift something heavy. Bladder training can increase how long you can wait before you have to urinate. It can also help your bladder hold more urine. And it can give you better control over the urge to urinate. It is important to remember that bladder training takes a few weeks to a few months to make a difference. You may not see results right away, but don't give up. Follow-up care is a key part of your treatment and safety. Be sure to make and go to all appointments, and call your doctor if you are having problems. It's also a good idea to know your test results and keep a list of the medicines you take. How can you care for yourself at home? Work with your doctor to come up with a bladder training program that is right for you. You may use one or more of the following methods. Delayed urination · In the beginning, try to keep from urinating for 5 minutes after you first feel the need to go. · While you wait, take deep, slow breaths to relax. Kegel exercises can also help you delay the need to go to the bathroom. · After some practice, when you can easily wait 5 minutes to urinate, try to wait 10 minutes before you urinate. · Slowly increase the waiting period until you are able to control when you have to urinate. Scheduled urination · Empty your bladder when you first wake up in the morning. · Schedule times throughout the day when you will urinate. · Start by going to the bathroom every hour, even if you don't need to go. · Slowly increase the time between trips to the bathroom. · When you have found a schedule that works well for you, keep doing it. · If you wake up during the night and have to urinate, do it. Apply your schedule to waking hours only. Kegel exercises These tighten and strengthen pelvic muscles, which can help you control the flow of urine. To do Kegel exercises: 
· Squeeze the same muscles you would use to stop your urine. Your belly and thighs should not move. · Hold the squeeze for 3 seconds, and then relax for 3 seconds. · Start with 3 seconds. Then add 1 second each week until you are able to squeeze for 10 seconds. · Repeat the exercise 10 to 15 times a session. Do three or more sessions a day. When should you call for help? Watch closely for changes in your health, and be sure to contact your doctor if: 
  · Your incontinence is getting worse.  
  · You do not get better as expected. Where can you learn more? Go to http://abbey-madeline.info/. Enter E081 in the search box to learn more about \"Bladder Training: Care Instructions. \" Current as of: March 20, 2018 Content Version: 11.9 © 3349-8055 [employer]. Care instructions adapted under license by [employer] (which disclaims liability or warranty for this information). If you have questions about a medical condition or this instruction, always ask your healthcare professional. [address] any warranty or liability for your use of this information.

## 2019-01-18 NOTE — PROGRESS NOTES
ROOM # 16 Identified pt with two pt identifiers(name and ). Reviewed record in preparation for visit and have obtained necessary documentation. Chief Complaint Patient presents with  Hypertension [address] [place] [address] preferred language for health care discussion is english/other. Is the patient using any DME equipment during OV? no 
 
[address] [place] [address] is due for: 
Health Maintenance Due Topic  GLAUCOMA SCREENING Q2Y [facility] Maintenance reviewed and discussed per provider Please order/place referral if appropriate. Advance Directive: 1. Do you have an advance directive in place? Patient Reply: Nanci Menon 
 
2. If not, would you like material regarding how to put one in place? NO Coordination of Care: 1. Have you been to the ER, urgent care clinic since your last visit? Hospitalized since your last visit? NO 
 
2. Have you seen or consulted any other health care providers outside of the [address] [place] [address] since your last visit? Include any pap smears or colon screening. YES Patient is accompanied by self I have received verbal consent from [address] [place] [address] to discuss any/all medical information while they are present in the room. Learning Assessment: 
No flowsheet data found. Depression Screening: PHQ over the last two weeks 2019 Little interest or pleasure in doing things Not at all Nearly every day Feeling down, depressed, irritable, or hopeless Not at all Not at all Total Score PHQ 2 0 3 Abuse Screening: No flowsheet data found. Fall Risk Fall Risk Assessment, last 12 mths 2019 Able to walk? Yes Yes Fall in past 12 months?  No No

## 2019-02-12 ENCOUNTER — DOCUMENTATION ONLY (OUTPATIENT)
Dept: INTERNAL MEDICINE CLINIC | Age: 79
End: 2019-02-12

## 2019-02-12 ENCOUNTER — HOSPITAL ENCOUNTER (OUTPATIENT)
Dept: LAB | Age: 79
Discharge: HOME | End: 2019-02-12
Payer: MEDICARE

## 2019-02-12 ENCOUNTER — OFFICE VISIT (OUTPATIENT)
Dept: INTERNAL MEDICINE CLINIC | Age: 79
End: 2019-02-12

## 2019-02-12 VITALS
SYSTOLIC BLOOD PRESSURE: 147 MMHG | WEIGHT: 158.8 LBS | HEIGHT: 65 IN | TEMPERATURE: 96.5 F | DIASTOLIC BLOOD PRESSURE: 65 MMHG | BODY MASS INDEX: 26.46 KG/M2 | HEART RATE: 76 BPM | OXYGEN SATURATION: 100 % | RESPIRATION RATE: 20 BRPM

## 2019-02-12 DIAGNOSIS — E83.51 LOW CALCIUM LEVELS: ICD-10-CM

## 2019-02-12 DIAGNOSIS — R00.0 TACHYCARDIA: Primary | ICD-10-CM

## 2019-02-12 DIAGNOSIS — F41.9 ANXIETY: ICD-10-CM

## 2019-02-12 LAB
25(OH)D3 SERPL-MCNC: 56.9 NG/ML (ref 30–100)
ALBUMIN SERPL-MCNC: 4.1 G/DL (ref 3.4–5)
ALBUMIN/GLOB SERPL: 1.2 {RATIO} (ref 0.8–1.7)
ALP SERPL-CCNC: 89 U/L (ref 45–117)
ALT SERPL-CCNC: 30 U/L (ref 13–56)
ANION GAP SERPL CALC-SCNC: 8 MMOL/L (ref 3–18)
AST SERPL-CCNC: 24 U/L (ref 15–37)
BILIRUB SERPL-MCNC: 0.6 MG/DL (ref 0.2–1)
BUN SERPL-MCNC: 16 MG/DL (ref 7–18)
BUN/CREAT SERPL: 19 (ref 12–20)
CALCIUM SERPL-MCNC: 9 MG/DL (ref 8.5–10.1)
CALCIUM SERPL-MCNC: 9.5 MG/DL (ref 8.5–10.1)
CHLORIDE SERPL-SCNC: 104 MMOL/L (ref 100–108)
CO2 SERPL-SCNC: 28 MMOL/L (ref 21–32)
CREAT SERPL-MCNC: 0.86 MG/DL (ref 0.6–1.3)
GLOBULIN SER CALC-MCNC: 3.3 G/DL (ref 2–4)
GLUCOSE SERPL-MCNC: 131 MG/DL (ref 74–99)
POTASSIUM SERPL-SCNC: 5 MMOL/L (ref 3.5–5.5)
PROT SERPL-MCNC: 7.4 G/DL (ref 6.4–8.2)
PTH-INTACT SERPL-MCNC: 42.7 PG/ML (ref 18.4–88)
SODIUM SERPL-SCNC: 140 MMOL/L (ref 136–145)

## 2019-02-12 PROCEDURE — 80053 COMPREHEN METABOLIC PANEL: CPT

## 2019-02-12 PROCEDURE — 83970 ASSAY OF PARATHORMONE: CPT

## 2019-02-12 PROCEDURE — 82652 VIT D 1 25-DIHYDROXY: CPT

## 2019-02-12 PROCEDURE — 82306 VITAMIN D 25 HYDROXY: CPT

## 2019-02-12 RX ORDER — SERTRALINE HYDROCHLORIDE 50 MG/1
25 TABLET, FILM COATED ORAL DAILY
Qty: 90 TAB | Refills: 0 | Status: SHIPPED | OUTPATIENT
Start: 2019-02-12 | End: 2019-05-10 | Stop reason: SDUPTHER

## 2019-02-12 NOTE — PROGRESS NOTES
HISTORY OF PRESENT ILLNESS Nile Gunter is a 70 y.o. female. 71 yo female here for evaluation of intermittent tachycardia. Notes elevated HR on Apple watch at times. Makes her anxious. She also wears a fit bit and sometimes the HR does not correlate. Has not been checking it manually. Does not have symptoms but states HR has been up to 200 on her watch after min exertion. In office, fit bit, apple watch, manual check were the same. She has seen cardiology/EP in the past with ablation, pacer discussed. Has metoprolol for prn use but concerned with taking regularly as her resting HR is usually in 60s. She does have h/o anxiety but feels it worsens by seeing fast HR rather than causing this. Had tried Zoloft in the past but only to 50 mg; did not notice difference so he stopped. Has had mildly low Ca++ on past labs. Has stopped taking calcium supplement. Review of Systems Constitutional: Negative for chills, fever and weight loss. HENT: Negative for congestion and ear pain. Eyes: Negative for blurred vision and pain. Respiratory: Negative for cough and shortness of breath. Cardiovascular: Negative for chest pain, palpitations and leg swelling. Gastrointestinal: Negative for nausea and vomiting. Genitourinary: Negative for frequency and urgency. Musculoskeletal: Negative for joint pain and myalgias. Skin: Negative for itching and rash. Neurological: Negative for dizziness, tingling and headaches. Psychiatric/Behavioral: Negative for depression. The patient is not nervous/anxious. Past Medical History:  
Diagnosis Date  Cancer ([facility] [address] skin  Hyperlipemia  Hypertension  Ill-defined condition H/O LOW WBC'S; MB CELL LYMPHTOSIS  
 SVT (supraventricular tachycardia) ([facility] [address] 01/2017  
 has low heart rate when resting Current Outpatient Medications on File Prior to Visit Medication Sig Dispense Refill  vitamin a-vitamin c-vitamin e (ANTIOXIDANT) cap 1 po qd  lisinopril (PRINIVIL, ZESTRIL) 10 mg tablet Take 1 Tab by mouth daily. 90 Tab 3  
 CHOLECALCIFEROL, VITAMIN D3, (VITAMIN D3 PO) Take  by mouth.  Vitamins-Lipotropics 200-100 mg tab Take  by mouth.  COQ10, LIPOSOMAL UBIQUINOL, PO Take  by mouth.  omega-3 fatty acids-vitamin e (FISH OIL) 1,000 mg cap Take 1 Cap by mouth daily.  calcium & magnesium carbonates 311-232 mg per tablet Take 1 Tab by mouth daily.  MULTIVITAMIN WITH MINERALS (MULTIVITAMIN & MINERAL FORMULA PO) Take 1 Tab by mouth daily.  rosuvastatin (CRESTOR) 5 mg tablet Take 5 mg by mouth nightly. No current facility-administered medications on file prior to visit. Physical Exam  
Constitutional: She appears well-developed and well-nourished. No distress. /65 (BP 1 Location: Right arm, BP Patient Position: Sitting)   Pulse 76   Temp 96.5 °F (35.8 °C) (Oral)   Resp 20   Ht 5' 5\" (1.651 m)   Wt 158 lb 12.8 oz (72 kg)   SpO2 100%   BMI 26.43 kg/m² Eyes: EOM are normal. Right eye exhibits no discharge. Left eye exhibits no discharge. No scleral icterus. Neck: Neck supple. Cardiovascular: Normal rate, regular rhythm and normal heart sounds. Exam reveals no gallop and no friction rub. No murmur heard. Pulmonary/Chest: Effort normal and breath sounds normal. No respiratory distress. She has no wheezes. She has no rales. Musculoskeletal: She exhibits no edema or tenderness. Lymphadenopathy:  
  She has no cervical adenopathy. Neurological: She is alert. She exhibits normal muscle tone. Skin: Skin is warm and dry. Psychiatric: She has a normal mood and affect. Lab Results Component Value Date/Time  Sodium 136 01/18/2019 01:21 PM  
 Potassium 3.8 01/18/2019 01:21 PM  
 Chloride 103 01/18/2019 01:21 PM  
 CO2 27 01/18/2019 01:21 PM  
 Anion gap 6 01/18/2019 01:21 PM  
 Glucose 83 01/18/2019 01:21 PM  
 BUN 14 01/18/2019 01:21 PM  
 Creatinine 0.83 01/18/2019 01:21 PM  
 BUN/Creatinine ratio 17 01/18/2019 01:21 PM  
 GFR est AA >60 01/18/2019 01:21 PM  
 GFR est non-AA >60 01/18/2019 01:21 PM  
 Calcium 8.1 (L) 01/18/2019 01:21 PM  
 Bilirubin, total 0.7 01/18/2019 01:21 PM  
 AST (SGOT) 25 01/18/2019 01:21 PM  
 Alk. phosphatase 77 01/18/2019 01:21 PM  
 Protein, total 6.9 01/18/2019 01:21 PM  
 Albumin 3.9 01/18/2019 01:21 PM  
 Globulin 3.0 01/18/2019 01:21 PM  
 A-G Ratio 1.3 01/18/2019 01:21 PM  
 ALT (SGPT) 30 01/18/2019 01:21 PM  
 
Lab Results Component Value Date/Time WBC 10.6 05/07/2011 04:00 AM  
 HGB 11.7 (L) 05/07/2011 04:00 AM  
 HCT 35.5 05/07/2011 04:00 AM  
 PLATELET 377 15/83/3755 04:00 AM  
 MCV 91.3 05/07/2011 04:00 AM  
 
Lab Results Component Value Date/Time Hemoglobin A1c 6.0 (H) 01/18/2019 01:21 PM  
 
ASSESSMENT and PLAN 
  ICD-10-CM ICD-9-CM 1. Tachycardia R00.0 785.0 REFERRAL TO CARDIOLOGY 2. Anxiety F41.9 300.00 3. Low calcium levels P53.34 562.59 METABOLIC PANEL, COMPREHENSIVE  
   VITAMIN D, 25 HYDROXY  
   VITAMIN D, 1, 25 DIHYDROXY  
   PTH INTACT Discussed doing manual check when she notices fast HR on her watch. Referral entered to cardiology for further opinion Will resume Zoloft and titrate dose Repeat labs today.

## 2019-02-12 NOTE — PROGRESS NOTES
ROOM # 16 Identified pt with two pt identifiers(name and ). Reviewed record in preparation for visit and have obtained necessary documentation. Chief Complaint Patient presents with  Anxiety [address] [place] [address] preferred language for health care discussion is english/other. Is the patient using any DME equipment during OV? NO [address] [place] [address] is due for: 
Health Maintenance Due Topic  GLAUCOMA SCREENING Q2Y Health Maintenance reviewed and discussed per provider Please order/place referral if appropriate. Advance Directive: 1. Do you have an advance directive in place? Patient Reply: NO 
 
2. If not, would you like material regarding how to put one in place? NO Coordination of Care: 1. Have you been to the ER, urgent care clinic since your last visit? Hospitalized since your last visit? NO 
 
2. Have you seen or consulted any other health care providers outside of the [address] [place] [address] since your last visit? Include any pap smears or colon screening. NO Patient is accompanied by self I have received verbal consent from [address] [place] [address] to discuss any/all medical information while they are present in the room. Learning Assessment: 
No flowsheet data found. Depression Screening: 
3 most recent [facility] Screens 2019 Little interest or pleasure in doing things Not at all Not at all Nearly every day Feeling down, depressed, irritable, or hopeless Not at all Not at all Not at all Total Score PHQ 2 0 0 3 Abuse Screening: No flowsheet data found. Fall Risk Fall Risk Assessment, last 12 mths 2019 Able to walk? Yes Yes Yes Fall in past 12 months?  No No No

## 2019-02-12 NOTE — PATIENT INSTRUCTIONS
Anxiety Disorder: Care Instructions Your Care Instructions Anxiety is a normal reaction to stress. Difficult situations can cause you to have symptoms such as sweaty palms and a nervous feeling. In an anxiety disorder, the symptoms are far more severe. Constant worry, muscle tension, trouble sleeping, nausea and diarrhea, and other symptoms can make normal daily activities difficult or impossible. These symptoms may occur for no reason, and they can affect your work, school, or social life. Medicines, counseling, and self-care can all help. Follow-up care is a key part of your treatment and safety. Be sure to make and go to all appointments, and call your doctor if you are having problems. It's also a good idea to know your test results and keep a list of the medicines you take. How can you care for yourself at home? · Take medicines exactly as directed. Call your doctor if you think you are having a problem with your medicine. · Go to your counseling sessions and follow-up appointments. · Recognize and accept your anxiety. Then, when you are in a situation that makes you anxious, say to yourself, \"This is not an emergency. I feel uncomfortable, but I am not in danger. I can keep going even if I feel anxious. \" · Be kind to your body: 
? Relieve tension with exercise or a massage. ? Get enough rest. 
? Avoid alcohol, caffeine, nicotine, and illegal drugs. They can increase your anxiety level and cause sleep problems. ? Learn and do relaxation techniques. See below for more about these techniques. · Engage your mind. Get out and do something you enjoy. Go to a funny movie, or take a walk or hike. Plan your day. Having too much or too little to do can make you anxious. · Keep a record of your symptoms. Discuss your fears with a good friend or family member, or join a support group for people with similar problems. Talking to others sometimes relieves stress. · Get involved in social groups, or volunteer to help others. Being alone sometimes makes things seem worse than they are. · Get at least 30 minutes of exercise on most days of the week to relieve stress. Walking is a good choice. You also may want to do other activities, such as running, swimming, cycling, or playing tennis or team sports. Relaxation techniques Do relaxation exercises 10 to 20 minutes a day. You can play soothing, relaxing music while you do them, if you wish. · Tell others in your house that you are going to do your relaxation exercises. Ask them not to disturb you. · Find a comfortable place, away from all distractions and noise. · Lie down on your back, or sit with your back straight. · Focus on your breathing. Make it slow and steady. · Breathe in through your nose. Breathe out through either your nose or mouth. · Breathe deeply, filling up the area between your navel and your rib cage. Breathe so that your belly goes up and down. · Do not hold your breath. · Breathe like this for 5 to 10 minutes. Notice the feeling of calmness throughout your whole body. As you continue to breathe slowly and deeply, relax by doing the following for another 5 to 10 minutes: · Tighten and relax each muscle group in your body. You can begin at your toes and work your way up to your head. · Imagine your muscle groups relaxing and becoming heavy. · Empty your mind of all thoughts. · Let yourself relax more and more deeply. · Become aware of the state of calmness that surrounds you. · When your relaxation time is over, you can bring yourself back to alertness by moving your fingers and toes and then your hands and feet and then stretching and moving your entire body. Sometimes people fall asleep during relaxation, but they usually wake up shortly afterward.  
· Always give yourself time to return to full alertness before you drive a car or do anything that might cause an accident if you are not fully alert. Never play a relaxation tape while you drive a car. When should you call for help? Call 911 anytime you think you may need emergency care. For example, call if: 
  · You feel you cannot stop from hurting yourself or someone else.  
Herlinda Gladys the numbers for these national suicide hotlines: 7-548-809-TALK (6-400.359.4630) and 4-889-VAOGHXN (8-262.420.5903). If you or someone you know talks about suicide or feeling hopeless, get help right away. 
 Watch closely for changes in your health, and be sure to contact your doctor if: 
  · You have anxiety or fear that affects your life.  
  · You have symptoms of anxiety that are new or different from those you had before. Where can you learn more? Go to http://abbeyRXi Pharmaceuticalsmadeline.info/. Enter P754 in the search box to learn more about \"Anxiety Disorder: Care Instructions. \" Current as of: September 11, 2018 Content Version: 11.9 © 4582-2002 [employer]. Care instructions adapted under license by [employer] (which disclaims liability or warranty for this information). If you have questions about a medical condition or this instruction, always ask your healthcare professional. [address] any warranty or liability for your use of this information. Hypocalcemia: Care Instructions Your Care Instructions Hypocalcemia means that the level of calcium in your blood is lower than it should be. Your doctor may have done tests to check your calcium levels because you had certain symptoms. These include tingling or twitching of your muscles. Your doctor may do more tests to find out why your calcium is low and to see how well your kidneys and other organs are working. Your doctor will also want to see how well your parathyroid gland is working. This gland controls calcium levels in your blood. You may have this problem because you are not getting enough calcium in your diet. Or your body may not be absorbing the calcium as it should. You may be able to get your calcium up to a safe level by taking supplements. If your levels are very low, your doctor may give you a calcium shot, possibly along with magnesium. You will probably also be given vitamin D, because you need it to absorb calcium. After your doctor has your calcium levels up, be sure to get plenty of calcium in your diet. If you have a kidney or parathyroid problem, you may need to keep taking extra calcium. Follow-up care is a key part of your treatment and safety. Be sure to make and go to all appointments, and call your doctor if you are having problems. It's also a good idea to know your test results and keep a list of the medicines you take. How can you care for yourself at home? · Take your medicines exactly as prescribed. Call your doctor if you think you are having a problem with your medicine. · Eat foods rich in calcium. These include yogurt, cheese, milk, and dark green vegetables. This is the best way to get the calcium you need. You can get vitamin D from eggs, fatty fish, cereal, and milk. · Talk to your doctor about taking a calcium plus vitamin D supplement. · Stay active. Regular weight-bearing exercise, such as walking, can help keep your bones strong. It can also improve your overall health. · Spend a small amount of time outside in the sun without sunscreen. The sun helps your body make vitamin D. Talk to your doctor first if you have had skin cancer or you are at high risk for skin cancer. When should you call for help? Call your doctor now or seek immediate medical care if: 
  · You feel numb or have tingling in your fingers and hands or toes and feet.  
  · You are confused or are having trouble remembering things.  
  · You have muscle spasms or cramps.   · Your heart seems to be speeding up and then slowing down or skipping beats.  
  · You are feeling down or blue, or you are not enjoying things like you once did. You may be depressed, which is common in people with hypocalcemia. Depression can be treated.  
 Watch closely for changes in your health, and be sure to contact your doctor if: 
  · You do not get better as expected. Where can you learn more? Go to http://abbey-madeline.info/. Enter P520 in the search box to learn more about \"Hypocalcemia: Care Instructions. \" Current as of: March 14, 2018 Content Version: 11.9 © 4515-2220 [employer]. Care instructions adapted under license by [employer] (which disclaims liability or warranty for this information). If you have questions about a medical condition or this instruction, always ask your healthcare professional. [address] any warranty or liability for your use of this information.

## 2019-02-12 NOTE — PROGRESS NOTES
Pharmacy Note: Immunization Update    Patient: Patricia Bey (54 y. o., 1940)     Patient's immunization history was updated to reflect information contained in the [place] and/or outside immunization/pharmacy records were reconciled within [facility]. Health Maintenance schedule updated when appropriate.     Current immunizations now reflect:       Immunization History   Administered Date(s) Administered    Influenza High Dose Vaccine PF 09/14/2015, 11/28/2017    Influenza Vaccine 11/17/2010, 10/27/2013, 09/29/2014    Influenza Vaccine (Quad) 11/10/2011    Influenza Vaccine (Tri) Adjuvanted 09/18/2018    Influenza Vaccine (Trivalent) 01/06/2013    Influenza Vaccine Intradermal PF 10/14/2016    Pneumococcal Conjugate (PCV-13) 07/15/2015    Pneumococcal Polysaccharide (PPSV-23) 12/09/2009, 06/27/2017    Tdap 12/09/2009    Zoster Recombinant 05/24/2018, 08/16/2018    Zoster Vaccine, Live 06/01/2006       Riky Larry, PharmD, BCACP

## 2019-02-13 LAB — 1,25(OH)2D3 SERPL-MCNC: 52.3 PG/ML (ref 19.9–79.3)

## 2019-02-14 ENCOUNTER — OFFICE VISIT (OUTPATIENT)
Dept: CARDIOLOGY CLINIC | Age: 79
End: 2019-02-14

## 2019-02-14 ENCOUNTER — TELEPHONE (OUTPATIENT)
Dept: INTERNAL MEDICINE CLINIC | Age: 79
End: 2019-02-14

## 2019-02-14 VITALS
WEIGHT: 164 LBS | OXYGEN SATURATION: 99 % | DIASTOLIC BLOOD PRESSURE: 75 MMHG | HEIGHT: 65 IN | SYSTOLIC BLOOD PRESSURE: 161 MMHG | BODY MASS INDEX: 27.32 KG/M2 | HEART RATE: 80 BPM

## 2019-02-14 DIAGNOSIS — F41.9 ANXIETY: ICD-10-CM

## 2019-02-14 DIAGNOSIS — I47.1 SVT (SUPRAVENTRICULAR TACHYCARDIA) (HCC): Primary | ICD-10-CM

## 2019-02-14 DIAGNOSIS — I10 ESSENTIAL HYPERTENSION: ICD-10-CM

## 2019-02-14 DIAGNOSIS — R00.0 TACHYCARDIA: ICD-10-CM

## 2019-02-14 DIAGNOSIS — E78.5 HYPERLIPIDEMIA, UNSPECIFIED HYPERLIPIDEMIA TYPE: ICD-10-CM

## 2019-02-14 NOTE — PROGRESS NOTES
1. Have you been to the ER, urgent care clinic since your last visit? Hospitalized since your last visit? No 
 
2. Have you seen or consulted any other health care providers outside of the [address] [place] [address] since your last visit? Include any pap smears or colon screening.  No

## 2019-02-14 NOTE — PATIENT INSTRUCTIONS
Suzette Jones will call to schedule your testing within 24-48 hours. If you do not hear from her, then please call her directly at [phone]. All testing/lab work is completed in [address] [place] [address] 150  
at [facility]/\[facility]\""

## 2019-02-20 ENCOUNTER — HOSPITAL ENCOUNTER (OUTPATIENT)
Dept: NON INVASIVE DIAGNOSTICS | Age: 79
Discharge: HOME | End: 2019-02-20
Attending: INTERNAL MEDICINE
Payer: MEDICARE

## 2019-02-20 DIAGNOSIS — I47.1 SVT (SUPRAVENTRICULAR TACHYCARDIA) (HCC): ICD-10-CM

## 2019-02-20 PROCEDURE — 93226 XTRNL ECG REC<48 HR SCAN A/R: CPT

## 2019-02-22 PROBLEM — F41.9 ANXIETY: Status: ACTIVE | Noted: 2019-02-22

## 2019-02-22 PROBLEM — R00.0 TACHYCARDIA: Status: ACTIVE | Noted: 2019-02-22

## 2019-02-22 PROBLEM — I10 ESSENTIAL HYPERTENSION: Status: ACTIVE | Noted: 2019-02-22

## 2019-02-22 PROBLEM — R00.2 PALPITATIONS: Status: ACTIVE | Noted: 2019-02-22

## 2019-02-22 NOTE — PROGRESS NOTES
Subjective:  
   WILLARD is in the office today for cardiac evaluation. She is a 72-year-old woman that has presumed history of SVT in the past.  She was treated in the emergency department after symptomatic episode after symptomatic episode of probably 2 years ago. She received adenosine at that time which corrected the arrhythmia. They did discuss further evaluation including electrophysiology and possible ablation, but the patient \"was not ready for that \". Symptoms the patient experienced at the above-mentioned episode were described as a \"sense of feeling like she was outside looking in\". She has had no recurrence of that symptomatology. She has had some occasional lightheadedness. She reports that she is \"very tired \". She wears a apple watch and occasionally a fit bit at the same time. She reports her heart rate is been noted to be 176-200 bpm at times. She says this makes her very anxious. She was prescribed Zoloft but has not started that medication as of yet. The patient has had no chest pain. She has had some \"heartburn\". She has been evaluated by GI in the recent past. 
Patient's cardiac risk factors are dyslipidemia, hypertension. Patient Active Problem List  
 Diagnosis Date Noted  Tachycardia 02/22/2019  Essential hypertension 02/22/2019  Anxiety 02/22/2019  Palpitations 02/22/2019  
 SVT (supraventricular tachycardia) ([facility] [address] 09/18/2018  Hyperlipidemia 09/18/2018  Epigastric pain 01/09/2018  Abdominal pain, other specified site 11/18/2013 Current Outpatient Medications Medication Sig Dispense Refill  vitamin a-vitamin c-vitamin e (ANTIOXIDANT) cap 1 po qd    
 lisinopril (PRINIVIL, ZESTRIL) 10 mg tablet Take 1 Tab by mouth daily. 90 Tab 3  
 CHOLECALCIFEROL, VITAMIN D3, (VITAMIN D3 PO) Take  by mouth.  Vitamins-Lipotropics 200-100 mg tab Take  by mouth.  COQ10, LIPOSOMAL UBIQUINOL, PO Take  by mouth.  omega-3 fatty acids-vitamin e (FISH OIL) 1,000 mg cap Take 1 Cap by mouth daily.  calcium & magnesium carbonates 311-232 mg per tablet Take 1 Tab by mouth daily.  MULTIVITAMIN WITH MINERALS (MULTIVITAMIN & MINERAL FORMULA PO) Take 1 Tab by mouth daily.  rosuvastatin (CRESTOR) 5 mg tablet Take 5 mg by mouth nightly.  sertraline (ZOLOFT) 50 mg tablet Take 0.5 Tabs by mouth daily. Increase to 1 tab daily in one week 90 Tab 0 Allergies Allergen Reactions  Sulfa (Sulfonamide Antibiotics) Swelling Past Medical History:  
Diagnosis Date  Cancer ([facility] [address] skin  Hyperlipemia  Hypertension  Ill-defined condition H/O LOW WBC'S; MB CELL LYMPHTOSIS  
 [facility]  
 has low heart rate when resting Past Surgical History:  
Procedure Laterality Date  HX APPENDECTOMY  HX GYN    
 rectocele  HX HEENT    
 TEAR DUCT SX  
 HX ORTHOPAEDIC MENISCUS TEAR Family History Problem Relation Age of Onset  Diabetes Mother  Coronary Artery Disease Mother  Diabetes Sister Social History Tobacco Use Smoking Status Former Smoker Smokeless Tobacco Never Used Review of Systems, additional: 
Constitutional: negative Eyes: negative Respiratory: negative Cardiovascular: positive for palpitations, fatigue, dizziness Gastrointestinal: negative Musculoskeletal:negative Neurological: negative Behvioral/Psych: negative Endocrine: negative ENT: negative Objective:  
 
Visit Vitals /75 Pulse 80 Ht 5' 5\" (1.651 m) Wt 164 lb (74.4 kg) SpO2 99% BMI 27.29 kg/m² General:  alert, cooperative, no distress Chest Wall: inspection normal - no chest wall deformities or tenderness, respiratory effort normal  
Lung: clear to auscultation bilaterally Heart:  normal rate and regular rhythm, S1 and S2 normal, no murmurs noted, no gallops noted, no JVD Abdomen: soft, non-tender. Bowel sounds normal. No masses,  no organomegaly Extremities: extremities normal, atraumatic, no cyanosis or edema Skin: no rashes Neuro: alert, oriented, normal speech, no focal findings or movement disorder noted EK2019. Sinus rhythm. Normal. 
 
Assessment/Plan: ICD-10-CM ICD-9-CM 1. SVT (supraventricular tachycardia) ([facility] [address] history of, was treated with adenosine in the emergency department at one-point. Will order 48-hour Holter monitor. Consider addition of Cardizem CD at next visit. Consider echocardiography next visit. I47.1 427.89 AMB POC EKG ROUTINE W/ 12 LEADS, INTER & REP  
   CARDIAC HOLTER MONITOR 2. Hyperlipidemia, unspecified hyperlipidemia type, LDL was 105 on 2018. Cholesterol 175. HDL 57. Triglycerides 79. Patient on low-dose Crestor, 5 mg daily E78.5 272.4 3. Essential hypertension, elevated systolic blood pressure in the office today. I10 401.9 4. Tachycardia, patient reports that apple watch has recorded heart rate between 175 and 200 bpm. R00.0 785.0 5. Anxiety F41.9 300.00

## 2019-03-15 ENCOUNTER — OFFICE VISIT (OUTPATIENT)
Dept: CARDIOLOGY CLINIC | Age: 79
End: 2019-03-15

## 2019-03-15 VITALS
WEIGHT: 161 LBS | OXYGEN SATURATION: 96 % | BODY MASS INDEX: 26.82 KG/M2 | HEART RATE: 66 BPM | SYSTOLIC BLOOD PRESSURE: 126 MMHG | HEIGHT: 65 IN | DIASTOLIC BLOOD PRESSURE: 65 MMHG

## 2019-03-15 DIAGNOSIS — I47.1 SVT (SUPRAVENTRICULAR TACHYCARDIA) (HCC): ICD-10-CM

## 2019-03-15 DIAGNOSIS — F41.9 ANXIETY: ICD-10-CM

## 2019-03-15 DIAGNOSIS — R07.89 OTHER CHEST PAIN: Primary | ICD-10-CM

## 2019-03-15 DIAGNOSIS — I10 ESSENTIAL HYPERTENSION: ICD-10-CM

## 2019-03-15 DIAGNOSIS — R00.0 TACHYCARDIA: ICD-10-CM

## 2019-03-15 DIAGNOSIS — R00.2 PALPITATIONS: ICD-10-CM

## 2019-03-15 DIAGNOSIS — E78.5 HYPERLIPIDEMIA, UNSPECIFIED HYPERLIPIDEMIA TYPE: ICD-10-CM

## 2019-03-15 NOTE — PATIENT INSTRUCTIONS
Suzette Jones will call to schedule your testing within 24-48 hours.  If you do not hear from her, then please call her directly at [phone].   -  [facility] scheduling) [phone]    All testing/lab work is completed in [address] [place] [address]   at [facility]

## 2019-03-17 NOTE — PROGRESS NOTES
Subjective:      Dion Brown is in the office today for cardiac evaluation. She is a 26-year-old woman that has presumed history of SVT in the past.  She was treated in the emergency department after a symptomatic episode that occurred probably 2 years ago. She received adenosine at that time which corrected the arrhythmia. They did discuss further evaluation including electrophysiology and possible ablation, but the patient \"was not ready for that \". The symptoms  that the patient experienced during the above-mentioned episode were described as a \"sense of feeling like she was outside looking in\". She has had no recurrence of that symptomatology. She has had some occasional lightheadedness. She does report that she is \"very tired \". She wears a apple watch and occasionally a fit bit at the same time. She reports her heart rate is been noted to be 176-200 bpm at times. She says this makes her very anxious. She was prescribed Zoloft but has not started that medication as of yet. The patient has had no chest pain. She has had some \"heartburn\". She has been evaluated by GI in the recent past.    In the office today, the Holter monitor was reviewed. She had a previous echocardiogram done on 2/2/17 which demonstrated normal systolic function and mild diastolic dysfunction. She continues to experience heartburn and occasional pain in her chest and left arm. Patient's cardiac risk factors are dyslipidemia, hypertension. Patient Active Problem List    Diagnosis Date Noted    Tachycardia 02/22/2019    Essential hypertension 02/22/2019    Anxiety 02/22/2019    Palpitations 02/22/2019    SVT (supraventricular tachycardia) ([facility] [address] 09/18/2018    Hyperlipidemia 09/18/2018    Epigastric pain 01/09/2018    Abdominal pain, other specified site 11/18/2013     Current Outpatient Medications   Medication Sig Dispense Refill    sertraline (ZOLOFT) 50 mg tablet Take 0.5 Tabs by mouth daily.  Increase to 1 tab daily in one week 90 Tab 0    vitamin a-vitamin c-vitamin e (ANTIOXIDANT) cap 1 po qd      lisinopril (PRINIVIL, ZESTRIL) 10 mg tablet Take 1 Tab by mouth daily. 90 Tab 3    CHOLECALCIFEROL, VITAMIN D3, (VITAMIN D3 PO) Take  by mouth.  Vitamins-Lipotropics 200-100 mg tab Take  by mouth.  COQ10, LIPOSOMAL UBIQUINOL, PO Take  by mouth.  omega-3 fatty acids-vitamin e (FISH OIL) 1,000 mg cap Take 1 Cap by mouth daily.  calcium & magnesium carbonates 311-232 mg per tablet Take 1 Tab by mouth daily.  MULTIVITAMIN WITH MINERALS (MULTIVITAMIN & MINERAL FORMULA PO) Take 1 Tab by mouth daily.  rosuvastatin (CRESTOR) 5 mg tablet Take 5 mg by mouth nightly.        Allergies   Allergen Reactions    Sulfa (Sulfonamide Antibiotics) Swelling     Past Medical History:   Diagnosis Date    Cancer ([facility] [address]     skin    Hyperlipemia     Hypertension     Ill-defined condition     H/O LOW WBC'S; MB CELL LYMPHTOSIS    SVT (supraventricular tachycardia) ([facility] [address] 01/2017    has low heart rate when resting     Past Surgical History:   Procedure Laterality Date    HX APPENDECTOMY      HX GYN      rectocele    HX HEENT      TEAR DUCT SX    HX ORTHOPAEDIC      MENISCUS TEAR     Family History   Problem Relation Age of Onset    Diabetes Mother     Coronary Artery Disease Mother     Diabetes Sister      Social History     Tobacco Use   Smoking Status Former Smoker   Smokeless Tobacco Never Used          Review of Systems, additional:  Constitutional: negative  Eyes: negative  Respiratory: negative  Cardiovascular: positive for palpitations, fatigue, dizziness  Gastrointestinal: negative  Musculoskeletal:negative  Neurological: negative  Behvioral/Psych: negative  Endocrine: negative  ENT: negative    Objective:     Visit Vitals  /65   Pulse 66   Ht 5' 5\" (1.651 m)   Wt 161 lb (73 kg)   SpO2 96%   BMI 26.79 kg/m²     General:  alert, cooperative, no distress   Chest Wall: inspection normal - no chest wall deformities or tenderness, respiratory effort normal   Lung: clear to auscultation bilaterally   Heart:  normal rate and regular rhythm, S1 and S2 normal, grade 1/6 to 2/6 systolic ejection murmur, no gallops noted, no JVD   Abdomen: soft, non-tender. Bowel sounds normal. No masses,  no organomegaly   Extremities: extremities normal, atraumatic, no cyanosis or edema Skin: no rashes   Neuro: alert, oriented, normal speech, no focal findings or movement disorder noted     EK2019. Sinus rhythm. Normal.    Assessment/Plan:         ICD-10-CM ICD-9-CM    1. SVT (supraventricular tachycardia) ([facility] [address] history of, was treated with adenosine in the emergency department at one-point. 48-hour Holter monitor was completed on 19. The patient was in sinus rhythm with an average heart rate of 65 bpm.  Heart rate range was  bpm.  There was no ectopy. Patient did have one episode when she  thought her heart rate was fast but it did not correlate with the  monitor. Will order stress cardiogram as patient continues to have chest pain and left arm pain at times. I47.1 427.89 AMB POC EKG ROUTINE W/ 12 LEADS, INTER & REP      CARDIAC HOLTER MONITOR   2. Hyperlipidemia, unspecified hyperlipidemia type, LDL was 105 on 2018. Cholesterol 175. HDL 57. Triglycerides 79. Patient on low-dose Crestor, 5 mg daily E78.5 272.4    3. Essential hypertension, controlled  blood pressure in the office today. I10 401.9    4. Tachycardia, patient reports that apple watch has recorded heart rate between 175 and 200 bpm. R00.0 785.0    5.  Anxiety F41.9 300.00

## 2019-04-03 ENCOUNTER — HOSPITAL ENCOUNTER (OUTPATIENT)
Dept: NON INVASIVE DIAGNOSTICS | Age: 79
Discharge: HOME | End: 2019-04-03
Attending: INTERNAL MEDICINE
Payer: MEDICARE

## 2019-04-03 VITALS
BODY MASS INDEX: 26.82 KG/M2 | HEIGHT: 65 IN | SYSTOLIC BLOOD PRESSURE: 135 MMHG | DIASTOLIC BLOOD PRESSURE: 61 MMHG | WEIGHT: 161 LBS

## 2019-04-03 DIAGNOSIS — R07.89 OTHER CHEST PAIN: ICD-10-CM

## 2019-04-03 LAB
STRESS ANGINA INDEX: 0
STRESS BASELINE HR: 70 BPM
STRESS ESTIMATED WORKLOAD: 5.5 METS
STRESS EXERCISE DUR MIN: NORMAL
STRESS PEAK DIAS BP: 55 MMHG
STRESS PEAK SYS BP: 161 MMHG
STRESS PERCENT HR ACHIEVED: 120 %
STRESS POST PEAK HR: 173 BPM
STRESS RATE PRESSURE PRODUCT: NORMAL BPM*MMHG
STRESS SR DUKE TREADMILL SCORE: 0
STRESS ST DEPRESSION: 0 MM
STRESS ST ELEVATION: 0 MM
STRESS TARGET HR: 144 BPM

## 2019-04-03 PROCEDURE — 93351 STRESS TTE COMPLETE: CPT

## 2019-04-09 ENCOUNTER — TELEPHONE (OUTPATIENT)
Dept: CARDIOLOGY CLINIC | Age: 79
End: 2019-04-09

## 2019-04-09 NOTE — TELEPHONE ENCOUNTER
Verbal order and read back per Amaris Zamarripa MD  Stress echo normal. Patient may cancel appt tomorrow     Patient aware     Will f/u in 6 months or sooner if needed

## 2019-04-10 NOTE — TELEPHONE ENCOUNTER
Zoloft is also approved for the treatment of anxiety. Please ask her to schedule an appointment to discuss alternatives.

## 2019-04-10 NOTE — TELEPHONE ENCOUNTER
Patient is calling in stating she is on week three of the Zoloft and she is still having anxiety. States this is one of the side effects and it was suggested she contact her prescribing doctor.   Can be reached a y [phone]

## 2019-04-10 NOTE — TELEPHONE ENCOUNTER
Pt contacted at home number. 2 pt identifiers confirmed. Pt informed of below. Pt has appointment already scheduled for 4/24/19 @ 11:15am.       Pt verbalized understanding. No other questions at this time.

## 2019-04-24 ENCOUNTER — OFFICE VISIT (OUTPATIENT)
Dept: INTERNAL MEDICINE CLINIC | Age: 79
End: 2019-04-24

## 2019-04-24 VITALS
BODY MASS INDEX: 26.49 KG/M2 | WEIGHT: 159 LBS | HEIGHT: 65 IN | DIASTOLIC BLOOD PRESSURE: 74 MMHG | OXYGEN SATURATION: 98 % | HEART RATE: 72 BPM | RESPIRATION RATE: 16 BRPM | SYSTOLIC BLOOD PRESSURE: 139 MMHG | TEMPERATURE: 96.5 F

## 2019-04-24 DIAGNOSIS — G89.29 CHRONIC BILATERAL LOW BACK PAIN, WITH SCIATICA PRESENCE UNSPECIFIED: ICD-10-CM

## 2019-04-24 DIAGNOSIS — M54.5 CHRONIC BILATERAL LOW BACK PAIN, WITH SCIATICA PRESENCE UNSPECIFIED: ICD-10-CM

## 2019-04-24 DIAGNOSIS — I10 ESSENTIAL HYPERTENSION: Primary | ICD-10-CM

## 2019-04-24 DIAGNOSIS — F41.9 ANXIETY: ICD-10-CM

## 2019-04-24 NOTE — PROGRESS NOTES
HISTORY OF PRESENT ILLNESS Kay Barrios is a 70 y.o. female. Here for f/u of anxiety, back pain, SVT Anxiety: feels Zoloft helps. Increased after seeing her oncologist, cardiology. Feels some increased. Has seen counselor [facility] followed by Dr. Don Mancilla. Some increase in counts which increased her stress. Back pain: Started in [address] after overuse. Seen by  PT discontinued in [facility] . Still with some pain. PT thought he had done all he could do. Still with some pain Had beenon R and now on L. Occasional feels foot goes to sleep Only happens when in bed. Not having sx when up. Also notes hands fall asleep in bed but resolve when up. Braces too uncomfortable. SVT still with some variable HR which makes her nervous. BP has been stable. Medication Evaluation Pertinent negatives include no chest pain, no headaches and no shortness of breath. Back Pain Associated symptoms include leg pain. Pertinent negatives include no chest pain, no fever, no weight loss, no headaches and no tingling. Leg Pain Associated symptoms include back pain. Pertinent negatives include no tingling and no itching. Review of Systems Constitutional: Negative for chills, fever and weight loss. HENT: Negative for congestion and ear pain. Eyes: Negative for blurred vision and pain. Respiratory: Negative for cough and shortness of breath. Cardiovascular: Positive for palpitations. Negative for chest pain and leg swelling. Gastrointestinal: Negative for nausea and vomiting. Genitourinary: Negative for frequency and urgency. Musculoskeletal: Positive for back pain. Negative for joint pain and myalgias. Skin: Negative for itching and rash. Neurological: Negative for dizziness, tingling and headaches. Psychiatric/Behavioral: Negative for depression. The patient is nervous/anxious. Past Medical History:  
Diagnosis Date  Cancer ([facility] [address] skin  Hyperlipemia  Hypertension  Ill-defined condition H/O LOW WBC'S; MB CELL LYMPHTOSIS  
 SVT (supraventricular tachycardia) ([facility] [place] [address] 01/2017  
 has low heart rate when resting Current Outpatient Medications on File Prior to Visit Medication Sig Dispense Refill  sertraline (ZOLOFT) 50 mg tablet Take 0.5 Tabs by mouth daily. Increase to 1 tab daily in one week 90 Tab 0  
 vitamin a-vitamin c-vitamin e (ANTIOXIDANT) cap 1 po qd    
 lisinopril (PRINIVIL, ZESTRIL) 10 mg tablet Take 1 Tab by mouth daily. 90 Tab 3  
 CHOLECALCIFEROL, VITAMIN D3, (VITAMIN D3 PO) Take  by mouth.  Vitamins-Lipotropics 200-100 mg tab Take  by mouth.  COQ10, LIPOSOMAL UBIQUINOL, PO Take  by mouth.  omega-3 fatty acids-vitamin e (FISH OIL) 1,000 mg cap Take 1 Cap by mouth daily.  calcium & magnesium carbonates 311-232 mg per tablet Take 1 Tab by mouth daily.  MULTIVITAMIN WITH MINERALS (MULTIVITAMIN & MINERAL FORMULA PO) Take 1 Tab by mouth daily.  rosuvastatin (CRESTOR) 5 mg tablet Take 5 mg by mouth nightly. No current facility-administered medications on file prior to visit. Social History Tobacco Use  Smoking status: Former Smoker  Smokeless tobacco: Never Used Substance Use Topics  Alcohol use: Yes Comment: occasionally  Drug use: No  
 
Physical Exam  
Constitutional: She appears well-developed and well-nourished. No distress. /74 (BP 1 Location: Left arm, BP Patient Position: Sitting)   Pulse 72   Temp 96.5 °F (35.8 °C) (Oral)   Resp 16   Ht 5' 5\" (1.651 m)   Wt 159 lb (72.1 kg)   SpO2 98%   BMI 26.46 kg/m² Eyes: EOM are normal. Right eye exhibits no discharge. Left eye exhibits no discharge. No scleral icterus. Neck: Neck supple. Cardiovascular: Normal rate, regular rhythm and normal heart sounds. Exam reveals no gallop and no friction rub. No murmur heard. Pulmonary/Chest: Effort normal and breath sounds normal. No respiratory distress. She has no wheezes. She has no rales. Abdominal: She exhibits no distension. Musculoskeletal: She exhibits no edema or tenderness. Lymphadenopathy:  
  She has no cervical adenopathy. Neurological: She is alert. She displays normal reflexes. She exhibits normal muscle tone. Negative straight leg raise Skin: Skin is warm and dry. Psychiatric: She has a normal mood and affect. Lab Results Component Value Date/Time Sodium 140 02/12/2019 08:41 AM  
 Potassium 5.0 02/12/2019 08:41 AM  
 Chloride 104 02/12/2019 08:41 AM  
 CO2 28 02/12/2019 08:41 AM  
 Anion gap 8 02/12/2019 08:41 AM  
 Glucose 131 (H) 02/12/2019 08:41 AM  
 BUN 16 02/12/2019 08:41 AM  
 Creatinine 0.86 02/12/2019 08:41 AM  
 BUN/Creatinine ratio 19 02/12/2019 08:41 AM  
 GFR est AA >60 02/12/2019 08:41 AM  
 GFR est non-AA >60 02/12/2019 08:41 AM  
 Calcium 9.0 02/12/2019 08:41 AM  
 Calcium 9.5 02/12/2019 08:41 AM  
 Bilirubin, total 0.6 02/12/2019 08:41 AM  
 AST (SGOT) 24 02/12/2019 08:41 AM  
 Alk. phosphatase 89 02/12/2019 08:41 AM  
 Protein, total 7.4 02/12/2019 08:41 AM  
 Albumin 4.1 02/12/2019 08:41 AM  
 Globulin 3.3 02/12/2019 08:41 AM  
 A-G Ratio 1.2 02/12/2019 08:41 AM  
 ALT (SGPT) 30 02/12/2019 08:41 AM  
 
Lab Results Component Value Date/Time WBC 10.6 05/07/2011 04:00 AM  
 HGB 11.7 (L) 05/07/2011 04:00 AM  
 HCT 35.5 05/07/2011 04:00 AM  
 PLATELET 198 60/19/3546 04:00 AM  
 MCV 91.3 05/07/2011 04:00 AM  
No results found for: TSH, TSHEXT, TSH2, TSH3, TSHP, TSHELE, TT3, T3U, T3UP, FRT3, FT3, T3T, FT4, FT4P, T4, T4P, FT4T, TT7, [id number], TSHEXT 
ASSESSMENT and PLAN 
  ICD-10-CM ICD-9-CM 1. Essential hypertension I10 401.9 2. Anxiety F41.9 300.00   
3. Chronic bilateral low back pain, with sciatica presence unspecified M54.5 724.2 G89.29 338.29 Will continue with current medication for now. Discussed that Zoloft can be increased to 75 mg daily to help with anxiety. She wishes to wait another few weeks on current dose. Discussed back pain. Will hold on imaging for now. Will try applied heat, warm baths at bedtimes since this seems to be when sx occur. Will do [address] next visit.

## 2019-04-24 NOTE — PROGRESS NOTES
Rm:17 Chief Complaint Patient presents with  Medication Evaluation Zoloft  Back Pain  Leg Pain  
  right Depression Screening: 
3 most recent [facility] Screens 4/24/2019 4/24/2019 2/12/2019 1/18/2019 9/18/2018 Little interest or pleasure in doing things Not at all Not at all Not at all Not at all Nearly every day Feeling down, depressed, irritable, or hopeless Not at all Not at all Not at all Not at all Not at all Total Score PHQ 2 0 0 0 0 3 Learning Assessment: 
No flowsheet data found. Abuse Screening: No flowsheet data found. Health Maintenance reviewed and discussed per provider: yes Coordination of Care: 1. Have you been to the ER, urgent care clinic since your last visit? Hospitalized since your last visit? no 
 
2. Have you seen or consulted any other health care providers outside of the [address] [place] [address] since your last visit? Include any pap smears or colon screening.  no

## 2019-04-24 NOTE — PATIENT INSTRUCTIONS
Learning About How to Have a Healthy Back What causes back pain? Back pain is often caused by overuse, strain, or injury. For example, people often hurt their backs playing sports or working in the yard, being jolted in a car accident, or lifting something too heavy. Aging plays a part too. Your bones and muscles tend to lose strength as you age, which makes injury more likely. The spongy discs between the bones of the spine (vertebrae) may suffer from wear and tear and no longer provide enough cushion between the bones. A disc that bulges or breaks open (herniated disc) can press on nerves, causing back pain. In some people, back pain is the result of arthritis, broken vertebrae caused by bone loss (osteoporosis), illness, or a spine problem. Although most people have back pain at one time or another, there are steps you can take to make it less likely. How can you have a healthy back? Reduce stress on your back through good posture Slumping or slouching alone may not cause low back pain. But after the back has been strained or injured, bad posture can make pain worse. · Sleep in a position that maintains your back's normal curves and on a mattress that feels comfortable. Sleep on your side with a pillow between your knees, or sleep on your back with a pillow under your knees. These positions can reduce strain on your back. · Stand and sit up straight. \"Good posture\" generally means your ears, shoulders, and hips are in a straight line. · If you must stand for a long time, put one foot on a stool, ledge, or box. Switch feet every now and then. · Sit in a chair that is low enough to let you place both feet flat on the floor with both knees nearly level with your hips. If your chair or desk is too high, use a footrest to raise your knees. Place a small pillow, a rolled-up towel, or a lumbar roll in the curve of your back if you need extra support. · Try a kneeling chair, which helps tilt your hips forward. This takes pressure off your lower back. · Try sitting on an exercise ball. It can rock from side to side, which helps keep your back loose. · When driving, keep your knees nearly level with your hips. Sit straight, and drive with both hands on the steering wheel. Your arms should be in a slightly bent position. Reduce stress on your back through careful lifting · Squat down, bending at the hips and knees only. If you need to, put one knee to the floor and extend your other knee in front of you, bent at a right angle (half kneeling). · Press your chest straight forward. This helps keep your upper back straight while keeping a slight arch in your low back. · Hold the load as close to your body as possible, at the level of your belly button (navel). · Use your feet to change direction, taking small steps. · Lead with your hips as you change direction. Keep your shoulders in line with your hips as you move. · Set down your load carefully, squatting with your knees and hips only. Exercise and stretch your back · Do some exercise on most days of the week, if your doctor says it is okay. You can walk, run, swim, or cycle. · Stretch your back muscles. Here are a few exercises to try: 
? Lie on your back, and gently pull one bent knee to your chest. Put that foot back on the floor, and then pull the other knee to your chest. 
? Do pelvic tilts. Lie on your back with your knees bent. Tighten your stomach muscles. Pull your belly button (navel) in and up toward your ribs. You should feel like your back is pressing to the floor and your hips and pelvis are slightly lifting off the floor. Hold for 6 seconds while breathing smoothly. ? Sit with your back flat against a wall. · Keep your core muscles strong. The muscles of your back, belly (abdomen), and buttocks support your spine. ? Pull in your belly and imagine pulling your navel toward your spine. Hold this for 6 seconds, then relax. Remember to keep breathing normally as you tense your muscles. ? Do curl-ups. Always do them with your knees bent. Keep your low back on the floor, and curl your shoulders toward your knees using a smooth, slow motion. Keep your arms folded across your chest. If this bothers your neck, try putting your hands behind your neck (not your head), with your elbows spread apart. ? Lie on your back with your knees bent and your feet flat on the floor. Tighten your belly muscles, and then push with your feet and raise your buttocks up a few inches. Hold this position 6 seconds as you continue to breathe normally, then lower yourself slowly to the floor. Repeat 8 to 12 times. ? If you like group exercise, try Pilates or yoga. These classes have poses that strengthen the core muscles. Lead a healthy lifestyle · Stay at a healthy weight to avoid strain on your back. · Do not smoke. Smoking increases the risk of osteoporosis, which weakens the spine. If you need help quitting, talk to your doctor about stop-smoking programs and medicines. These can increase your chances of quitting for good. Where can you learn more? Go to http://abbey-madeline.info/. Enter L315 in the search box to learn more about \"Learning About How to Have a Healthy Back. \" Current as of: September 20, 2018 Content Version: 11.9 © 5920-1683 [employer]. Care instructions adapted under license by [employer] (which disclaims liability or warranty for this information). If you have questions about a medical condition or this instruction, always ask your healthcare professional. [place] [address] any warranty or liability for your use of this information. Back Pain, Emergency or Urgent Symptoms: Care Instructions Your Care Instructions Many people have back pain at one time or another.  In most cases, pain gets better with self-care that includes over-the-counter pain medicine, ice, heat, and exercises. Unless you have symptoms of a severe injury or heart attack, you may be able to give yourself a few days before you call a doctor. But some back problems are very serious. Do not ignore symptoms that need to be checked right away. Follow-up care is a key part of your treatment and safety. Be sure to make and go to all appointments, and call your doctor if you are having problems. It's also a good idea to know your test results and keep a list of the medicines you take. How can you care for yourself at home? · Sit or lie in positions that are most comfortable and that reduce your pain. Try one of these positions when you lie down: ? Lie on your back with your knees bent and supported by large pillows. ? Lie on the floor with your legs on the seat of a sofa or chair. ? Lie on your side with your knees and hips bent and a pillow between your legs. ? Lie on your stomach if it does not make pain worse. · Do not sit up in bed, and avoid soft couches and twisted positions. Bed rest can help relieve pain at first, but it delays healing. Avoid bed rest after the first day. · Change positions every 30 minutes. If you must sit for long periods of time, take breaks from sitting. Get up and walk around, or lie flat. · Try using a heating pad on a low or medium setting, for 15 to 20 minutes every 2 or 3 hours. Try a warm shower in place of one session with the heating pad. You can also buy single-use heat wraps that last up to 8 hours. You can also try ice or cold packs on your back for 10 to 20 minutes at a time, several times a day. (Put a thin cloth between the ice pack and your skin.) This reduces pain and makes it easier to be active and exercise. · Take pain medicines exactly as directed. ? If the doctor gave you a prescription medicine for pain, take it as prescribed. ? If you are not taking a prescription pain medicine, ask your doctor if you can take an over-the-counter medicine. When should you call for help? Call 911 anytime you think you may need emergency care. For example, call if: 
  · You are unable to move a leg at all.  
  · You have back pain with severe belly pain.  
  · You have symptoms of a heart attack. These may include: 
? Chest pain or pressure, or a strange feeling in the chest. 
? Sweating. ? Shortness of breath. ? Nausea or vomiting. ? Pain, pressure, or a strange feeling in the back, neck, jaw, or upper belly or in one or both shoulders or arms. ? Lightheadedness or sudden weakness. ? A fast or irregular heartbeat. After you call 911, the  may tell you to chew 1 adult-strength or 2 to 4 low-dose aspirin. Wait for an ambulance. Do not try to drive yourself.  
 Call your doctor now or seek immediate medical care if: 
  · You have new or worse symptoms in your arms, legs, chest, belly, or buttocks. Symptoms may include: 
? Numbness or tingling. ? Weakness. ? Pain.  
  · You lose bladder or bowel control.  
  · You have back pain and: ? You have injured your back while lifting or doing some other activity. Call if the pain is severe, has not gone away after 1 or 2 days, and you cannot do your normal daily activities. ? You have had a back injury before that needed treatment. ? Your pain has lasted longer than 4 weeks. ? You have had weight loss you cannot explain. ? You have a fever. ? You are age 48 or older. ? You have cancer now or have had it before.  
 Watch closely for changes in your health, and be sure to contact your doctor if you are not getting better as expected. Where can you learn more? Go to http://abbey-madeline.info/. Enter J688 in the search box to learn more about \"Back Pain, Emergency or Urgent Symptoms: Care Instructions. \" Current as of: September 23, 2018 Content Version: 11.9 © 5698-9186 [employer]. Care instructions adapted under license by [employer] (which disclaims liability or warranty for this information). If you have questions about a medical condition or this instruction, always ask your healthcare professional. [address] any warranty or liability for your use of this information.

## 2019-05-17 RX ORDER — SERTRALINE HYDROCHLORIDE 50 MG/1
50 TABLET, FILM COATED ORAL DAILY
Qty: 90 TAB | Refills: 3 | Status: SHIPPED | OUTPATIENT
Start: 2019-05-17 | End: 2019-12-09

## 2019-05-17 NOTE — TELEPHONE ENCOUNTER
Requested Prescriptions     Pending Prescriptions Disp Refills    sertraline (ZOLOFT) 50 mg tablet 90 Tab 3     Sig: Take 1 Tab by mouth daily.

## 2019-07-24 ENCOUNTER — HOSPITAL ENCOUNTER (OUTPATIENT)
Dept: LAB | Age: 79
Discharge: HOME | End: 2019-07-24
Payer: MEDICARE

## 2019-07-24 ENCOUNTER — OFFICE VISIT (OUTPATIENT)
Dept: INTERNAL MEDICINE CLINIC | Age: 79
End: 2019-07-24

## 2019-07-24 VITALS
HEART RATE: 75 BPM | HEIGHT: 65 IN | TEMPERATURE: 95.4 F | DIASTOLIC BLOOD PRESSURE: 85 MMHG | WEIGHT: 156.8 LBS | OXYGEN SATURATION: 97 % | SYSTOLIC BLOOD PRESSURE: 148 MMHG | RESPIRATION RATE: 17 BRPM | BODY MASS INDEX: 26.12 KG/M2

## 2019-07-24 DIAGNOSIS — R20.0 NUMBNESS AND TINGLING IN BOTH HANDS: ICD-10-CM

## 2019-07-24 DIAGNOSIS — I10 ESSENTIAL HYPERTENSION: Primary | ICD-10-CM

## 2019-07-24 DIAGNOSIS — I10 ESSENTIAL HYPERTENSION: ICD-10-CM

## 2019-07-24 DIAGNOSIS — F41.9 ANXIETY: ICD-10-CM

## 2019-07-24 DIAGNOSIS — R20.2 NUMBNESS AND TINGLING IN BOTH HANDS: ICD-10-CM

## 2019-07-24 LAB
ALBUMIN SERPL-MCNC: 3.8 G/DL (ref 3.4–5)
ALBUMIN/GLOB SERPL: 1.2 {RATIO} (ref 0.8–1.7)
ALP SERPL-CCNC: 87 U/L (ref 45–117)
ALT SERPL-CCNC: 25 U/L (ref 13–56)
ANION GAP SERPL CALC-SCNC: 6 MMOL/L (ref 3–18)
AST SERPL-CCNC: 22 U/L (ref 10–38)
BASOPHILS # BLD: 0 K/UL (ref 0–0.1)
BASOPHILS NFR BLD: 0 % (ref 0–2)
BILIRUB SERPL-MCNC: 0.6 MG/DL (ref 0.2–1)
BUN SERPL-MCNC: 18 MG/DL (ref 7–18)
BUN/CREAT SERPL: 22 (ref 12–20)
CALCIUM SERPL-MCNC: 8.3 MG/DL (ref 8.5–10.1)
CHLORIDE SERPL-SCNC: 106 MMOL/L (ref 100–111)
CO2 SERPL-SCNC: 27 MMOL/L (ref 21–32)
CREAT SERPL-MCNC: 0.82 MG/DL (ref 0.6–1.3)
DIFFERENTIAL METHOD BLD: ABNORMAL
EOSINOPHIL # BLD: 0 K/UL (ref 0–0.4)
EOSINOPHIL NFR BLD: 2 % (ref 0–5)
ERYTHROCYTE [DISTWIDTH] IN BLOOD BY AUTOMATED COUNT: 15 % (ref 11.6–14.5)
FOLATE SERPL-MCNC: >20 NG/ML (ref 3.1–17.5)
GLOBULIN SER CALC-MCNC: 3.1 G/DL (ref 2–4)
GLUCOSE SERPL-MCNC: 107 MG/DL (ref 74–99)
HCT VFR BLD AUTO: 39.2 % (ref 35–45)
HGB BLD-MCNC: 12.7 G/DL (ref 12–16)
LYMPHOCYTES # BLD: 1.4 K/UL (ref 0.9–3.6)
LYMPHOCYTES NFR BLD: 53 % (ref 21–52)
MCH RBC QN AUTO: 29.1 PG (ref 24–34)
MCHC RBC AUTO-ENTMCNC: 32.4 G/DL (ref 31–37)
MCV RBC AUTO: 89.7 FL (ref 74–97)
MONOCYTES # BLD: 0.2 K/UL (ref 0.05–1.2)
MONOCYTES NFR BLD: 6 % (ref 3–10)
NEUTS SEG # BLD: 1 K/UL (ref 1.8–8)
NEUTS SEG NFR BLD: 39 % (ref 40–73)
PLATELET # BLD AUTO: 166 K/UL (ref 135–420)
PMV BLD AUTO: 9.3 FL (ref 9.2–11.8)
POTASSIUM SERPL-SCNC: 4.6 MMOL/L (ref 3.5–5.5)
PROT SERPL-MCNC: 6.9 G/DL (ref 6.4–8.2)
RBC # BLD AUTO: 4.37 M/UL (ref 4.2–5.3)
SODIUM SERPL-SCNC: 139 MMOL/L (ref 136–145)
TSH SERPL DL<=0.05 MIU/L-ACNC: 1.94 UIU/ML (ref 0.36–3.74)
VIT B12 SERPL-MCNC: 830 PG/ML (ref 211–911)
WBC # BLD AUTO: 2.7 K/UL (ref 4.6–13.2)

## 2019-07-24 PROCEDURE — 85025 COMPLETE CBC W/AUTO DIFF WBC: CPT

## 2019-07-24 PROCEDURE — 36415 COLL VENOUS BLD VENIPUNCTURE: CPT

## 2019-07-24 PROCEDURE — 82607 VITAMIN B-12: CPT

## 2019-07-24 PROCEDURE — 84443 ASSAY THYROID STIM HORMONE: CPT

## 2019-07-24 PROCEDURE — 80053 COMPREHEN METABOLIC PANEL: CPT

## 2019-07-24 RX ORDER — LISINOPRIL 10 MG/1
10 TABLET ORAL DAILY
Qty: 90 TAB | Refills: 3 | Status: SHIPPED | OUTPATIENT
Start: 2019-07-24

## 2019-07-24 RX ORDER — ROSUVASTATIN CALCIUM 5 MG/1
5 TABLET, COATED ORAL
Qty: 90 TAB | Refills: 3 | Status: SHIPPED | COMMUNITY
Start: 2019-07-24

## 2019-07-24 RX ORDER — FEXOFENADINE HCL AND PSEUDOEPHEDRINE HCI 60; 120 MG/1; MG/1
1 TABLET, EXTENDED RELEASE ORAL EVERY 12 HOURS
COMMUNITY
End: 2019-12-09

## 2019-07-24 NOTE — PATIENT INSTRUCTIONS
Carpal Tunnel Syndrome: Care Instructions  Your Care Instructions    Carpal tunnel syndrome is a nerve problem. It can cause tingling, numbness, weakness, or pain in the fingers, thumb, and hand. The median nerve and several tough tissues called tendons run through a space in the wrist called the carpal tunnel. The repeated hand motions used in work and some hobbies and sports can put pressure on the nerve. Pregnancy and several conditions, including diabetes, arthritis, and an underactive thyroid, also can cause carpal tunnel syndrome. You may be able to limit an activity or do it differently to reduce your symptoms. You also can take other steps to feel better. If your symptoms are mild, 1 to 2 weeks of home treatment are likely to ease your pain. Surgery is needed only if other treatments do not work. Follow-up care is a key part of your treatment and safety. Be sure to make and go to all appointments, and call your doctor if you are having problems. It's also a good idea to know your test results and keep a list of the medicines you take. How can you care for yourself at home? · If possible, stop or reduce the activity that causes your symptoms. If you cannot stop the activity, take frequent breaks to rest and stretch or change hand positions to do a task. Try switching hands, such as when using a computer mouse. · Try to avoid bending or twisting your wrists. · Ask your doctor if you can take an over-the-counter pain medicine, such as acetaminophen (Tylenol), ibuprofen (Advil, Motrin), or naproxen (Aleve). Be safe with medicines. Read and follow all instructions on the label. · If your doctor prescribes corticosteroid medicine to help reduce pain and swelling, take it exactly as prescribed. Call your doctor if you think you are having a problem with your medicine. · Put ice or a cold pack on your wrist for 10 to 20 minutes at a time to ease pain.  Put a thin cloth between the ice and your skin.  · If your doctor or your physical or occupational therapist tells you to wear a wrist splint, wear it as directed to keep your wrist in a neutral position. This also eases pressure on your median nerve. · Ask your doctor whether you should have physical or occupational therapy to learn how to do tasks differently. · Try a yoga class to stretch your muscles and build strength in your hands and wrists. Yoga has been shown to ease carpal tunnel symptoms. To prevent carpal tunnel  · When working at a computer, keep your hands and wrists in line with your forearms. Hold your elbows close to your sides. Take a break every 10 to 15 minutes. · Try these exercises:  ? Warm up: Rotate your wrist up, down, and from side to side. Repeat this 4 times. Stretch your fingers far apart, relax them, then stretch them again. Repeat 4 times. Stretch your thumb by pulling it back gently, holding it, and then releasing it. Repeat 4 times. ? Prayer stretch: Start with your palms together in front of your chest just below your chin. Slowly lower your hands toward your waistline while keeping your hands close to your stomach and your palms together until you feel a mild to moderate stretch under your forearms. Hold for 10 to 20 seconds. Repeat 4 times. ? Wrist flexor stretch: Hold your arm in front of you with your palm up. Bend your wrist, pointing your hand toward the floor. With your other hand, gently bend your wrist further until you feel a mild to moderate stretch in your forearm. Hold for 10 to 20 seconds. Repeat 4 times. ? Wrist extensor stretch: Repeat the steps for the wrist flexor stretch, but begin with your extended hand palm down. · Squeeze a rubber exercise ball several times a day to keep your hands and fingers strong. · Avoid holding objects (such as a book) in one position for a long time. When possible, use your whole hand to grasp an object.  Using just the thumb and index finger can put stress on the wrist.  · Do not smoke. It can make this condition worse by reducing blood flow to the median nerve. If you need help quitting, talk to your doctor about stop-smoking programs and medicines. These can increase your chances of quitting for good. When should you call for help? Watch closely for changes in your health, and be sure to contact your doctor if:    · Your pain or other problems do not get better with home care.     · You want more information about physical or occupational therapy.     · You have side effects of your corticosteroid medicine, such as:  ? Weight gain. ? Mood changes. ? Trouble sleeping. ? Bruising easily.     · You have any other problems with your medicine. Where can you learn more? Go to http://abbey-madeline.info/. Enter R432 in the search box to learn more about \"Carpal Tunnel Syndrome: Care Instructions. \"  Current as of: September 20, 2018  Content Version: 12.1  © 5633-3592 [email or web address]. Care instructions adapted under license by "[employer] (which disclaims liability or warranty for this information). If you have questions about a medical condition or this instruction, always ask your healthcare professional. [place] [address] any warranty or liability for your use of this information. Carpal Tunnel Syndrome: Exercises  Introduction  Here are some examples of exercises for you to try. The exercises may be suggested for a condition or for rehabilitation. Start each exercise slowly. Ease off the exercises if you start to have pain. You will be told when to start these exercises and which ones will work best for you. Warm-up stretches  When you no longer have pain or numbness, you can do exercises to help prevent carpal tunnel syndrome from coming back. Do not do any stretch or movement that is uncomfortable or painful. 1. Rotate your wrist up, down, and from side to side. Repeat 4 times.   2. Stretch your fingers far apart. Relax them, and then stretch them again. Repeat 4 times. 3. Stretch your thumb by pulling it back gently, holding it, and then releasing it. Repeat 4 times. How to do the exercises  Prayer stretch    1. Start with your palms together in front of your chest just below your chin. 2. Slowly lower your hands toward your waistline, keeping your hands close to your stomach and your palms together until you feel a mild to moderate stretch under your forearms. 3. Hold for at least 15 to 30 seconds. Repeat 2 to 4 times. Wrist flexor stretch    1. Extend your arm in front of you with your palm up. 2. Bend your wrist, pointing your hand toward the floor. 3. With your other hand, gently bend your wrist farther until you feel a mild to moderate stretch in your forearm. 4. Hold for at least 15 to 30 seconds. Repeat 2 to 4 times. Wrist extensor stretch    1. Repeat steps 1 through 4 of the stretch above, but begin with your extended hand palm down. Follow-up care is a key part of your treatment and safety. Be sure to make and go to all appointments, and call your doctor if you are having problems. It's also a good idea to know your test results and keep a list of the medicines you take. Where can you learn more? Go to http://abbey-madeline.info/. Enter E805 in the search box to learn more about \"Carpal Tunnel Syndrome: Exercises. \"  Current as of: September 20, 2018  Content Version: 12.1  © 8806-5982 [employer]. Care instructions adapted under license by [employer] (which disclaims liability or warranty for this information). If you have questions about a medical condition or this instruction, always ask your healthcare professional. [place] [address] any warranty or liability for your use of this information.

## 2019-07-24 NOTE — PROGRESS NOTES
HISTORY OF PRESENT ILLNESS  Sumeet Jiang is a 70 y.o. female. Here for f/u of HTN. C/o hands going 'to sleep'. Will occur at night and improves with shaking out. Can feel sensation at times if touching wrist.   Back discomfort at times. Can have discomfort if flat on in bed, feet can feel slightly numb  Does not like taking Zoloft. Feels 'kind of weird\" at times. Tried to cut back to 25mg but went back to 50 after mood declined. Considering CBD oil  HTN: BP a little increased for her but overall has been stable. Reports prior elevation of LFTs  Leukopenia worsened at [facility] onc visit per patient. Review of Systems   Constitutional: Negative for chills, fever and weight loss. HENT: Negative for congestion and ear pain. Eyes: Negative for blurred vision and pain. Respiratory: Negative for cough and shortness of breath. Cardiovascular: Negative for chest pain, palpitations and leg swelling. Gastrointestinal: Negative for nausea and vomiting. Genitourinary: Negative for frequency and urgency. Musculoskeletal: Positive for back pain and joint pain. Negative for myalgias. Skin: Negative for itching and rash. Neurological: Positive for tingling. Negative for dizziness and headaches. Psychiatric/Behavioral: Negative for depression. The patient is not nervous/anxious. Past Medical History:   Diagnosis Date    Cancer ([facility] [address]     skin    Hyperlipemia     Hypertension     Ill-defined condition     H/O LOW WBC'S; MB CELL LYMPHTOSIS    SVT (supraventricular tachycardia) ([facility] [address] 01/2017    has low heart rate when resting     Current Outpatient Medications on File Prior to Visit   Medication Sig Dispense Refill    sodium chloride (AYR SALINE) 0.65 % drop 2 Drops every two (2) hours as needed.  fexofenadine-pseudoephedrine (ALLEGRA-D 12 HOUR)  mg Tb12 Take 1 Tab by mouth every twelve (12) hours.  sertraline (ZOLOFT) 50 mg tablet Take 1 Tab by mouth daily.  90 Tab 3    vitamin a-vitamin c-vitamin e (ANTIOXIDANT) cap 1 po qd      lisinopril (PRINIVIL, ZESTRIL) 10 mg tablet Take 1 Tab by mouth daily. 90 Tab 3    CHOLECALCIFEROL, VITAMIN D3, (VITAMIN D3 PO) Take  by mouth.  Vitamins-Lipotropics 200-100 mg tab Take  by mouth.  COQ10, LIPOSOMAL UBIQUINOL, PO Take  by mouth.  omega-3 fatty acids-vitamin e (FISH OIL) 1,000 mg cap Take 1 Cap by mouth daily.  calcium & magnesium carbonates 311-232 mg per tablet Take 1 Tab by mouth daily.  MULTIVITAMIN WITH MINERALS (MULTIVITAMIN & MINERAL FORMULA PO) Take 1 Tab by mouth daily.  rosuvastatin (CRESTOR) 5 mg tablet Take 5 mg by mouth nightly. No current facility-administered medications on file prior to visit. Physical Exam   Constitutional: She appears well-developed and well-nourished. No distress. /85 (BP 1 Location: Left arm, BP Patient Position: Sitting)   Pulse 75   Temp 95.4 °F (35.2 °C) (Oral)   Resp 17   Ht 5' 5\" (1.651 m)   Wt 156 lb 12.8 oz (71.1 kg)   SpO2 97%   BMI 26.09 kg/m²      Eyes: EOM are normal. Right eye exhibits no discharge. Left eye exhibits no discharge. No scleral icterus. Neck: Neck supple. Cardiovascular: Normal rate, regular rhythm and normal heart sounds. Exam reveals no gallop and no friction rub. No murmur heard. Pulmonary/Chest: Effort normal and breath sounds normal. No respiratory distress. She has no wheezes. She has no rales. Musculoskeletal: She exhibits no edema or tenderness. Lymphadenopathy:     She has no cervical adenopathy. Neurological: She is alert. She exhibits normal muscle tone. + tinels/phalens   Skin: Skin is warm and dry. Psychiatric: She has a normal mood and affect.      Lab Results   Component Value Date/Time    Cholesterol, total 178 09/19/2018 08:20 AM    HDL Cholesterol 57 09/19/2018 08:20 AM    LDL, calculated 105.2 (H) 09/19/2018 08:20 AM    VLDL, calculated 15.8 09/19/2018 08:20 AM    Triglyceride 79 09/19/2018 08:20 AM CHOL/HDL Ratio 3.1 09/19/2018 08:20 AM     Lab Results   Component Value Date/Time    Sodium 140 02/12/2019 08:41 AM    Potassium 5.0 02/12/2019 08:41 AM    Chloride 104 02/12/2019 08:41 AM    CO2 28 02/12/2019 08:41 AM    Anion gap 8 02/12/2019 08:41 AM    Glucose 131 (H) 02/12/2019 08:41 AM    BUN 16 02/12/2019 08:41 AM    Creatinine 0.86 02/12/2019 08:41 AM    BUN/Creatinine ratio 19 02/12/2019 08:41 AM    GFR est AA >60 02/12/2019 08:41 AM    GFR est non-AA >60 02/12/2019 08:41 AM    Calcium 9.0 02/12/2019 08:41 AM    Calcium 9.5 02/12/2019 08:41 AM    Bilirubin, total 0.6 02/12/2019 08:41 AM    AST (SGOT) 24 02/12/2019 08:41 AM    Alk. phosphatase 89 02/12/2019 08:41 AM    Protein, total 7.4 02/12/2019 08:41 AM    Albumin 4.1 02/12/2019 08:41 AM    Globulin 3.3 02/12/2019 08:41 AM    A-G Ratio 1.2 02/12/2019 08:41 AM    ALT (SGPT) 30 02/12/2019 08:41 AM     Lab Results   Component Value Date/Time    WBC 10.6 05/07/2011 04:00 AM    HGB 11.7 (L) 05/07/2011 04:00 AM    HCT 35.5 05/07/2011 04:00 AM    PLATELET 970 34/36/4868 04:00 AM    MCV 91.3 05/07/2011 04:00 AM   No results found for: B12LT, FOL, RBCF   Lab Results   Component Value Date/Time    Hemoglobin A1c 6.0 (H) 01/18/2019 01:21 PM     ASSESSMENT and PLAN    ICD-10-CM ICD-9-CM    1. Essential hypertension I10 401.9 CBC WITH AUTOMATED DIFF      METABOLIC PANEL, COMPREHENSIVE      TSH 3RD GENERATION   2. Anxiety F41.9 300.00    3. Numbness and tingling in both hands R20.0 782.0 VITAMIN B12 & FOLATE    R20.2     Monitor BP; repeat labs today  Will continue with current medication for now. She may decide to taper zoloft in future  Suspect CTS, Recommend wrist braces at night to see if sx improve.

## 2019-07-24 NOTE — PROGRESS NOTES
ROOM # 17  Identified pt with two pt identifiers(name and ). Reviewed record in preparation for visit and have obtained necessary documentation. No chief complaint on file. [address] [place] [address] preferred language for health care discussion is english/other. Is the patient using any DME equipment during OV? NO    [address] [place] [address] is due for:  Health Maintenance Due   Topic    GLAUCOMA SCREENING Q2Y      Health Maintenance reviewed and discussed per provider  Please order/place referral if appropriate. Advance Directive:  1. Do you have an advance directive in place? Patient Reply: NO    2. If not, would you like material regarding how to put one in place? NO    Coordination of Care:  1. Have you been to the ER, urgent care clinic since your last visit? Hospitalized since your last visit? NO    2. Have you seen or consulted any other health care providers outside of the [address] [place] [address] since your last visit? Include any pap smears or colon screening. NO    Patient is accompanied by self I have received verbal consent from [address] [place] [address] to discuss any/all medical information while they are present in the room. Learning Assessment:  Done in the past   Depression Screening:  3 most recent [place] [id number] Screens 2019   Little interest or pleasure in doing things Not at all Not at all Not at all Not at all Nearly every day   Feeling down, depressed, irritable, or hopeless Not at all Not at all Not at all Not at all Not at all   Total Score PHQ 2 0 0 0 0 3     Abuse Screening:  n/i  Fall Risk  Fall Risk Assessment, last 12 mths 2019   Able to walk? Yes Yes Yes Yes Yes Yes   Fall in past 12 months?  No No No No No No

## 2019-11-07 ENCOUNTER — HOSPITAL ENCOUNTER (OUTPATIENT)
Dept: LAB | Age: 79
Discharge: HOME | End: 2019-11-07
Payer: MEDICARE

## 2019-11-07 LAB
FERRITIN SERPL-MCNC: 36 NG/ML (ref 8–388)
IRON SATN MFR SERPL: 14 %
IRON SERPL-MCNC: 51 UG/DL (ref 50–175)
RETICS/RBC NFR AUTO: 1.7 % (ref 0.5–2.3)
TIBC SERPL-MCNC: 365 UG/DL (ref 250–450)
VIT B12 SERPL-MCNC: 779 PG/ML (ref 211–911)

## 2019-11-07 PROCEDURE — 82607 VITAMIN B-12: CPT

## 2019-11-07 PROCEDURE — 36415 COLL VENOUS BLD VENIPUNCTURE: CPT

## 2019-11-07 PROCEDURE — 83540 ASSAY OF IRON: CPT

## 2019-11-07 PROCEDURE — 82747 ASSAY OF FOLIC ACID RBC: CPT

## 2019-11-07 PROCEDURE — 85045 AUTOMATED RETICULOCYTE COUNT: CPT

## 2019-11-07 PROCEDURE — 82728 ASSAY OF FERRITIN: CPT

## 2019-11-08 LAB
FOLATE BLD-MCNC: 503.4 NG/ML
FOLATE RBC-MCNC: 1430 NG/ML
HCT VFR BLD AUTO: 35.2 % (ref 34–46.6)

## 2019-12-10 PROBLEM — D50.9 IRON DEFICIENCY ANEMIA: Status: ACTIVE | Noted: 2019-12-10

## 2019-12-30 ENCOUNTER — HOSPITAL ENCOUNTER (OUTPATIENT)
Dept: ULTRASOUND IMAGING | Age: 79
Discharge: HOME | End: 2019-12-30
Attending: INTERNAL MEDICINE
Payer: MEDICARE

## 2019-12-30 DIAGNOSIS — R10.9 ABDOMINAL PAIN: ICD-10-CM

## 2019-12-30 DIAGNOSIS — R10.2 ACUTE PELVIC PAIN, FEMALE: ICD-10-CM

## 2019-12-30 DIAGNOSIS — R14.0 BLOATING: ICD-10-CM

## 2019-12-30 PROCEDURE — 76770 US EXAM ABDO BACK WALL COMP: CPT

## 2019-12-30 PROCEDURE — 76830 TRANSVAGINAL US NON-OB: CPT

## 2020-01-16 ENCOUNTER — HOSPITAL ENCOUNTER (OUTPATIENT)
Dept: GENERAL RADIOLOGY | Age: 80
Discharge: HOME | End: 2020-01-16
Attending: INTERNAL MEDICINE
Payer: MEDICARE

## 2020-01-16 DIAGNOSIS — D50.9 IRON DEFICIENCY ANEMIA, UNSPECIFIED: ICD-10-CM

## 2020-01-16 PROCEDURE — 74011000255 HC RX REV CODE- 255: Performed by: INTERNAL MEDICINE

## 2020-01-16 PROCEDURE — 74250 X-RAY XM SM INT 1CNTRST STD: CPT

## 2020-01-16 RX ADMIN — BARIUM SULFATE 600 ML: 240 SUSPENSION ORAL at 09:48

## 2020-06-15 ENCOUNTER — HOSPITAL ENCOUNTER (OUTPATIENT)
Dept: CT IMAGING | Age: 80
Discharge: HOME | End: 2020-06-15
Attending: INTERNAL MEDICINE
Payer: MEDICARE

## 2020-06-15 DIAGNOSIS — R10.819 ABDOMINAL TENDERNESS, UNSPECIFIED SITE: ICD-10-CM

## 2020-06-15 DIAGNOSIS — R14.0 GASTRIC TYMPANY: ICD-10-CM

## 2020-06-15 PROCEDURE — 82565 ASSAY OF CREATININE: CPT

## 2020-06-15 PROCEDURE — 74011636320 HC RX REV CODE- 636/320: Performed by: INTERNAL MEDICINE

## 2020-06-15 PROCEDURE — 74177 CT ABD & PELVIS W/CONTRAST: CPT

## 2020-06-15 RX ADMIN — IOPAMIDOL 100 ML: 612 INJECTION, SOLUTION INTRAVENOUS at 08:35

## 2020-06-16 LAB — CREAT UR-MCNC: 0.8 MG/DL (ref 0.6–1.3)

## 2022-03-18 PROBLEM — I47.1 SVT (SUPRAVENTRICULAR TACHYCARDIA) (HCC): Status: ACTIVE | Noted: 2018-09-18

## 2022-03-18 PROBLEM — F41.9 ANXIETY: Status: ACTIVE | Noted: 2019-02-22

## 2022-03-19 PROBLEM — I10 ESSENTIAL HYPERTENSION: Status: ACTIVE | Noted: 2019-02-22

## 2022-03-19 PROBLEM — R00.2 PALPITATIONS: Status: ACTIVE | Noted: 2019-02-22

## 2022-03-19 PROBLEM — R10.13 EPIGASTRIC PAIN: Status: ACTIVE | Noted: 2018-01-09

## 2022-03-20 PROBLEM — E78.5 HYPERLIPIDEMIA: Status: ACTIVE | Noted: 2018-09-18

## 2022-03-20 PROBLEM — R00.0 TACHYCARDIA: Status: ACTIVE | Noted: 2019-02-22

## 2022-03-20 PROBLEM — D50.9 IRON DEFICIENCY ANEMIA: Status: ACTIVE | Noted: 2019-12-10

## 2022-11-29 NOTE — TELEPHONE ENCOUNTER
Patient called in to let you know her Cardiologist is going to have her wearing a halter monitor, and she is going to hold off starting the Zoloft until all her cardiac testing is done. Telephone call to patient. Relayed that [facility] stated that he only has ambulance transportation benefit but no non emergent transportation benefit.

## (undated) DEVICE — SYR 50ML SLIP TIP NSAF LF STRL --

## (undated) DEVICE — KENDALL 500 SERIES DIAPHORETIC FOAM MONITORING ELECTRODE - TEAR DROP SHAPE ( 30/PK): Brand: KENDALL

## (undated) DEVICE — CONTAINER PREFIL FRMLN 15ML --

## (undated) DEVICE — BASIN EMESIS 500CC ROSE 250/CS 60/PLT: Brand: MEDEGEN MEDICAL PRODUCTS, LLC

## (undated) DEVICE — MEDI-VAC NON-CONDUCTIVE SUCTION TUBING: Brand: CARDINAL HEALTH

## (undated) DEVICE — BITE BLK ENDOSCP AD 54FR GRN POLYETH ENDOSCP W STRP SLD

## (undated) DEVICE — FORCEPS BX L240CM JAW DIA2.8MM L CAP W/ NDL MIC MESH TOOTH

## (undated) DEVICE — KIT COLON W/ 1.1OZ LUB AND 2 END

## (undated) DEVICE — FLEX ADVANTAGE 1500CC: Brand: FLEX ADVANTAGE